# Patient Record
Sex: MALE | Race: WHITE | NOT HISPANIC OR LATINO | Employment: OTHER | URBAN - METROPOLITAN AREA
[De-identification: names, ages, dates, MRNs, and addresses within clinical notes are randomized per-mention and may not be internally consistent; named-entity substitution may affect disease eponyms.]

---

## 2022-09-09 ENCOUNTER — HOSPITAL ENCOUNTER (INPATIENT)
Facility: HOSPITAL | Age: 72
LOS: 4 days | Discharge: HOME | DRG: 440 | End: 2022-09-13
Attending: INTERNAL MEDICINE | Admitting: INTERNAL MEDICINE
Payer: MEDICARE

## 2022-09-09 DIAGNOSIS — K86.89 PANCREATIC MASS: Primary | ICD-10-CM

## 2022-09-09 DIAGNOSIS — R00.0 TACHYCARDIA: ICD-10-CM

## 2022-09-09 DIAGNOSIS — R19.7 DIARRHEA, UNSPECIFIED TYPE: ICD-10-CM

## 2022-09-09 LAB
ALBUMIN SERPL-MCNC: 2.9 GM/DL (ref 3.4–4.8)
ALBUMIN/GLOB SERPL: 0.8 RATIO (ref 1.1–2)
ALP SERPL-CCNC: 99 UNIT/L (ref 40–150)
ALT SERPL-CCNC: 25 UNIT/L (ref 0–55)
AST SERPL-CCNC: 25 UNIT/L (ref 5–34)
BASOPHILS # BLD AUTO: 0.03 X10(3)/MCL (ref 0–0.2)
BASOPHILS NFR BLD AUTO: 0.2 %
BILIRUBIN DIRECT+TOT PNL SERPL-MCNC: 1.1 MG/DL
BUN SERPL-MCNC: 21.1 MG/DL (ref 8.4–25.7)
CALCIUM SERPL-MCNC: 8.8 MG/DL (ref 8.8–10)
CHLORIDE SERPL-SCNC: 103 MMOL/L (ref 98–107)
CLOSTRIDIUM DIFFICILE GDH ANTIGEN (OHS): NEGATIVE
CLOSTRIDIUM DIFFICILE TOXIN A/B (OHS): NEGATIVE
CO2 SERPL-SCNC: 21 MMOL/L (ref 23–31)
CREAT SERPL-MCNC: 0.95 MG/DL (ref 0.73–1.18)
CRP SERPL HS-MCNC: >160 MG/L
EOSINOPHIL # BLD AUTO: 0.06 X10(3)/MCL (ref 0–0.9)
EOSINOPHIL NFR BLD AUTO: 0.3 %
ERYTHROCYTE [DISTWIDTH] IN BLOOD BY AUTOMATED COUNT: 13.3 % (ref 11.5–17)
ERYTHROCYTE [SEDIMENTATION RATE] IN BLOOD: 63 MM/HR (ref 0–15)
FECAL LEUKOCYTE (OHS): POSITIVE
GFR SERPLBLD CREATININE-BSD FMLA CKD-EPI: >60 MLS/MIN/1.73/M2
GLOBULIN SER-MCNC: 3.7 GM/DL (ref 2.4–3.5)
GLUCOSE SERPL-MCNC: 108 MG/DL (ref 82–115)
HCT VFR BLD AUTO: 39.7 % (ref 42–52)
HGB BLD-MCNC: 13.2 GM/DL (ref 14–18)
IMM GRANULOCYTES # BLD AUTO: 0.13 X10(3)/MCL (ref 0–0.04)
IMM GRANULOCYTES NFR BLD AUTO: 0.7 %
LACTATE SERPL-SCNC: 0.8 MMOL/L (ref 0.5–2.2)
LEUKO NEG CONT (OHS): NEGATIVE
LEUKO POS CONT (OHS): POSITIVE
LYMPHOCYTES # BLD AUTO: 1.28 X10(3)/MCL (ref 0.6–4.6)
LYMPHOCYTES NFR BLD AUTO: 7.1 %
MAGNESIUM SERPL-MCNC: 2.4 MG/DL (ref 1.6–2.6)
MCH RBC QN AUTO: 30.6 PG (ref 27–31)
MCHC RBC AUTO-ENTMCNC: 33.2 MG/DL (ref 33–36)
MCV RBC AUTO: 91.9 FL (ref 80–94)
MONOCYTES # BLD AUTO: 1.56 X10(3)/MCL (ref 0.1–1.3)
MONOCYTES NFR BLD AUTO: 8.7 %
MRSA PCR SCRN (OHS): NOT DETECTED
NEUTROPHILS # BLD AUTO: 15 X10(3)/MCL (ref 2.1–9.2)
NEUTROPHILS NFR BLD AUTO: 83 %
NRBC BLD AUTO-RTO: 0 %
PHOSPHATE SERPL-MCNC: 2.9 MG/DL (ref 2.3–4.7)
PLATELET # BLD AUTO: 127 X10(3)/MCL (ref 130–400)
PMV BLD AUTO: 12 FL (ref 7.4–10.4)
POTASSIUM SERPL-SCNC: 3.8 MMOL/L (ref 3.5–5.1)
PROT SERPL-MCNC: 6.6 GM/DL (ref 5.8–7.6)
RBC # BLD AUTO: 4.32 X10(6)/MCL (ref 4.7–6.1)
SODIUM SERPL-SCNC: 133 MMOL/L (ref 136–145)
WBC # SPEC AUTO: 18 X10(3)/MCL (ref 4.5–11.5)

## 2022-09-09 PROCEDURE — 11000001 HC ACUTE MED/SURG PRIVATE ROOM

## 2022-09-09 PROCEDURE — 93010 EKG 12-LEAD: ICD-10-PCS | Mod: ,,, | Performed by: INTERNAL MEDICINE

## 2022-09-09 PROCEDURE — 83605 ASSAY OF LACTIC ACID: CPT | Performed by: NURSE PRACTITIONER

## 2022-09-09 PROCEDURE — 86141 C-REACTIVE PROTEIN HS: CPT | Performed by: NURSE PRACTITIONER

## 2022-09-09 PROCEDURE — 93005 ELECTROCARDIOGRAM TRACING: CPT

## 2022-09-09 PROCEDURE — 87045 FECES CULTURE AEROBIC BACT: CPT | Performed by: NURSE PRACTITIONER

## 2022-09-09 PROCEDURE — 86318 IA INFECTIOUS AGENT ANTIBODY: CPT | Performed by: PHYSICIAN ASSISTANT

## 2022-09-09 PROCEDURE — 27000207 HC ISOLATION

## 2022-09-09 PROCEDURE — 84100 ASSAY OF PHOSPHORUS: CPT | Performed by: NURSE PRACTITIONER

## 2022-09-09 PROCEDURE — 87040 BLOOD CULTURE FOR BACTERIA: CPT | Performed by: NURSE PRACTITIONER

## 2022-09-09 PROCEDURE — 25000003 PHARM REV CODE 250: Performed by: NURSE PRACTITIONER

## 2022-09-09 PROCEDURE — 80053 COMPREHEN METABOLIC PANEL: CPT | Performed by: NURSE PRACTITIONER

## 2022-09-09 PROCEDURE — 36415 COLL VENOUS BLD VENIPUNCTURE: CPT | Performed by: NURSE PRACTITIONER

## 2022-09-09 PROCEDURE — 85651 RBC SED RATE NONAUTOMATED: CPT | Performed by: NURSE PRACTITIONER

## 2022-09-09 PROCEDURE — 85025 COMPLETE CBC W/AUTO DIFF WBC: CPT | Performed by: NURSE PRACTITIONER

## 2022-09-09 PROCEDURE — 83735 ASSAY OF MAGNESIUM: CPT | Performed by: NURSE PRACTITIONER

## 2022-09-09 PROCEDURE — 87798 DETECT AGENT NOS DNA AMP: CPT | Performed by: INTERNAL MEDICINE

## 2022-09-09 PROCEDURE — 93010 ELECTROCARDIOGRAM REPORT: CPT | Mod: ,,, | Performed by: INTERNAL MEDICINE

## 2022-09-09 PROCEDURE — 25000003 PHARM REV CODE 250: Performed by: INTERNAL MEDICINE

## 2022-09-09 PROCEDURE — 86403 PARTICLE AGGLUT ANTBDY SCRN: CPT | Performed by: NURSE PRACTITIONER

## 2022-09-09 PROCEDURE — 87641 MR-STAPH DNA AMP PROBE: CPT | Performed by: NURSE PRACTITIONER

## 2022-09-09 PROCEDURE — 63600175 PHARM REV CODE 636 W HCPCS: Performed by: NURSE PRACTITIONER

## 2022-09-09 RX ORDER — AMLODIPINE BESYLATE 10 MG/1
10 TABLET ORAL DAILY
COMMUNITY

## 2022-09-09 RX ORDER — HYDROCODONE BITARTRATE AND ACETAMINOPHEN 5; 325 MG/1; MG/1
1 TABLET ORAL EVERY 6 HOURS PRN
Status: DISCONTINUED | OUTPATIENT
Start: 2022-09-09 | End: 2022-09-13 | Stop reason: HOSPADM

## 2022-09-09 RX ORDER — IPRATROPIUM BROMIDE AND ALBUTEROL SULFATE 2.5; .5 MG/3ML; MG/3ML
3 SOLUTION RESPIRATORY (INHALATION) EVERY 4 HOURS PRN
Status: DISCONTINUED | OUTPATIENT
Start: 2022-09-09 | End: 2022-09-13 | Stop reason: HOSPADM

## 2022-09-09 RX ORDER — ACETAMINOPHEN 500 MG
1000 TABLET ORAL EVERY 6 HOURS PRN
Status: DISCONTINUED | OUTPATIENT
Start: 2022-09-09 | End: 2022-09-13 | Stop reason: HOSPADM

## 2022-09-09 RX ORDER — AMLODIPINE BESYLATE 5 MG/1
10 TABLET ORAL DAILY
Status: DISCONTINUED | OUTPATIENT
Start: 2022-09-09 | End: 2022-09-13 | Stop reason: HOSPADM

## 2022-09-09 RX ORDER — BUPROPION HYDROCHLORIDE 150 MG/1
150 TABLET ORAL DAILY
COMMUNITY

## 2022-09-09 RX ORDER — MELOXICAM 7.5 MG/1
7.5 TABLET ORAL DAILY
COMMUNITY

## 2022-09-09 RX ORDER — IBUPROFEN 200 MG
24 TABLET ORAL
Status: DISCONTINUED | OUTPATIENT
Start: 2022-09-09 | End: 2022-09-13 | Stop reason: HOSPADM

## 2022-09-09 RX ORDER — ONDANSETRON 2 MG/ML
4 INJECTION INTRAMUSCULAR; INTRAVENOUS EVERY 4 HOURS PRN
Status: DISCONTINUED | OUTPATIENT
Start: 2022-09-09 | End: 2022-09-13 | Stop reason: HOSPADM

## 2022-09-09 RX ORDER — COLCHICINE 0.6 MG/1
0.6 TABLET ORAL DAILY
COMMUNITY

## 2022-09-09 RX ORDER — HYDROCHLOROTHIAZIDE 12.5 MG/1
12.5 TABLET ORAL DAILY
COMMUNITY

## 2022-09-09 RX ORDER — GLUCAGON 1 MG
1 KIT INJECTION
Status: DISCONTINUED | OUTPATIENT
Start: 2022-09-09 | End: 2022-09-13 | Stop reason: HOSPADM

## 2022-09-09 RX ORDER — MORPHINE SULFATE 4 MG/ML
4 INJECTION, SOLUTION INTRAMUSCULAR; INTRAVENOUS EVERY 4 HOURS PRN
Status: DISCONTINUED | OUTPATIENT
Start: 2022-09-09 | End: 2022-09-13 | Stop reason: HOSPADM

## 2022-09-09 RX ORDER — LOSARTAN POTASSIUM 100 MG/1
100 TABLET ORAL DAILY
COMMUNITY

## 2022-09-09 RX ORDER — ATORVASTATIN CALCIUM 10 MG/1
20 TABLET, FILM COATED ORAL NIGHTLY
Status: DISCONTINUED | OUTPATIENT
Start: 2022-09-09 | End: 2022-09-13 | Stop reason: HOSPADM

## 2022-09-09 RX ORDER — SIMVASTATIN 20 MG/1
20 TABLET, FILM COATED ORAL NIGHTLY
COMMUNITY

## 2022-09-09 RX ORDER — SODIUM CHLORIDE, SODIUM LACTATE, POTASSIUM CHLORIDE, CALCIUM CHLORIDE 600; 310; 30; 20 MG/100ML; MG/100ML; MG/100ML; MG/100ML
INJECTION, SOLUTION INTRAVENOUS CONTINUOUS
Status: DISCONTINUED | OUTPATIENT
Start: 2022-09-09 | End: 2022-09-13 | Stop reason: HOSPADM

## 2022-09-09 RX ORDER — IBUPROFEN 200 MG
16 TABLET ORAL
Status: DISCONTINUED | OUTPATIENT
Start: 2022-09-09 | End: 2022-09-13 | Stop reason: HOSPADM

## 2022-09-09 RX ADMIN — DOXYCYCLINE 100 MG: 100 INJECTION, POWDER, LYOPHILIZED, FOR SOLUTION INTRAVENOUS at 04:09

## 2022-09-09 RX ADMIN — SODIUM CHLORIDE, POTASSIUM CHLORIDE, SODIUM LACTATE AND CALCIUM CHLORIDE: 600; 310; 30; 20 INJECTION, SOLUTION INTRAVENOUS at 12:09

## 2022-09-09 RX ADMIN — AMLODIPINE BESYLATE 10 MG: 5 TABLET ORAL at 02:09

## 2022-09-09 RX ADMIN — PIPERACILLIN AND TAZOBACTAM 4.5 G: 4; .5 INJECTION, POWDER, LYOPHILIZED, FOR SOLUTION INTRAVENOUS; PARENTERAL at 04:09

## 2022-09-09 RX ADMIN — ATORVASTATIN CALCIUM 20 MG: 10 TABLET, FILM COATED ORAL at 08:09

## 2022-09-09 RX ADMIN — SODIUM CHLORIDE, POTASSIUM CHLORIDE, SODIUM LACTATE AND CALCIUM CHLORIDE: 600; 310; 30; 20 INJECTION, SOLUTION INTRAVENOUS at 04:09

## 2022-09-09 RX ADMIN — PIPERACILLIN AND TAZOBACTAM 4.5 G: 4; .5 INJECTION, POWDER, LYOPHILIZED, FOR SOLUTION INTRAVENOUS; PARENTERAL at 12:09

## 2022-09-09 RX ADMIN — PIPERACILLIN AND TAZOBACTAM 4.5 G: 4; .5 INJECTION, POWDER, LYOPHILIZED, FOR SOLUTION INTRAVENOUS; PARENTERAL at 08:09

## 2022-09-09 NOTE — CONSULTS
Gastroenterology Consult    Reason for Consult:     Pancreatic mass    HPI:  Yosvany Driver is a 72 y.o. male unknown to our service w/ pmhx of HTN, HLD, alcoholism, gout, and anxiety/depression transferred from [facility]. He presented to the ED there yesterday with abd pain, n/v/d. He reports having epigastric pain for about 3 weeks, presented to [place] ED at that time and was diagnosed with acute pancreatitis. He spent three days inpatient, reports that he was given IV abx then. symptoms improved and he was discharged. The pain gradually returned with new onset watery diarrhea x 1 week. He now reports epigastric pain radiating into the LUQ. Upon admission here, he has a WBC of 23.4, lipase 533, findings of colitis, pancreatitis, a pancreatic mass and multiple small liver cysts. He is on doxycycline and zosyn here. LFTs wnl. VSS     He states that he quit drinking 8-9 weeks ago, not because he was experiencing pain but just because he thought it was time to finally quit. He has been drinking for many years, and admits that his consumption was much more significant when he was younger. Recently he was drinking about 2 beers Monday and Wednesday, and then 8-10 beers every Friday/Saturday. He smokes but has been cutting back slowly.    CT abd/pelv w/o contrast 9/8/22: focal colitis of splenic flexure of colon with moderate periserosal inflammatory stranding. Infectious vs. Ischemic colitis considered. Meghan left lung infiltrates, renal calculi without obstruction.    Ct abd/pelv w/ contrast 9/8/22: hypoenhancing small mass occupying the inferior head of the pancreas highly suspicious for neoplasm. Associated w/ pancreatic ductal dilatation.     At home meds: amlodipine, HCTZ, mobic, creon, losartan, simvastatin, wellbutrin.     No hx of abd surgeries reported. No significant family hx. He does report hx of EGD and colonoscopy but states they were many years ago, results unknown.     PCP:  Provider  [facility]    Review of patient's allergies indicates:  No Known Allergies    Current Facility-Administered Medications   Medication Dose Route Frequency Provider Last Rate Last Admin    acetaminophen tablet 1,000 mg  1,000 mg Oral Q6H PRN Pily ALFREDO Hayley, FNP        albuterol-ipratropium 2.5 mg-0.5 mg/3 mL nebulizer solution 3 mL  3 mL Nebulization Q4H PRN Pily ALFREDO Hayley, FNP        doxycycline (VIBRAMYCIN) 100 mg in dextrose 5 % 250 mL IVPB  100 mg Intravenous Q12H Pily Tranch, FNP        glucagon (human recombinant) injection 1 mg  1 mg Intramuscular PRN Esperanza B Doumit, AGACNP-BC        glucose chewable tablet 16 g  16 g Oral PRN Esperanza B Doumit, AGACNP-BC        glucose chewable tablet 24 g  24 g Oral PRN Esperanza B Doumit, AGACNP-BC        HYDROcodone-acetaminophen 5-325 mg per tablet 1 tablet  1 tablet Oral Q6H PRN Pily ALFREDO Hayley, FNP        lactated ringers infusion   Intravenous Continuous Pily Hardy,  mL/hr at 09/09/22 0429 New Bag at 09/09/22 0429    morphine injection 4 mg  4 mg Intravenous Q4H PRN Pily ALFREDO Hayley, FNP        ondansetron injection 4 mg  4 mg Intravenous Q4H PRN Pily ALFREDO Hayley, FNP        piperacillin-tazobactam (ZOSYN) 4.5 g in dextrose 5 % in water (D5W) 5 % 100 mL IVPB (MB+)  4.5 g Intravenous Q8H Pily Hardy, FNP   Stopped at 09/09/22 0829     Medications Prior to Admission   Medication Sig Dispense Refill Last Dose    amLODIPine (NORVASC) 10 MG tablet Take 10 mg by mouth once daily.       buPROPion (WELLBUTRIN XL) 150 MG TB24 tablet Take 150 mg by mouth once daily.       colchicine (COLCRYS) 0.6 mg tablet Take 0.6 mg by mouth once daily.       hydroCHLOROthiazide (HYDRODIURIL) 12.5 MG Tab Take 12.5 mg by mouth once daily.       lipase/protease/amylase (PANCRELIPASE EC ORAL)        losartan (COZAAR) 100 MG tablet Take 100 mg by mouth once daily.       meloxicam (MOBIC) 7.5 MG tablet Take 7.5 mg by mouth once daily.       simvastatin (ZOCOR) 20 MG tablet Take 20 mg by mouth every  evening.          Past Medical History:  No past medical history on file.    Past Surgical History:  No past surgical history on file.    Family History:  No family history on file.    Social History:  Social History     Tobacco Use    Smoking status: Not on file    Smokeless tobacco: Not on file   Substance Use Topics    Alcohol use: Not on file           Review of Systems:    Review of Systems   Constitutional:  Negative for fever and unexpected weight change.   Respiratory:  Negative for cough and shortness of breath.    Cardiovascular:  Negative for chest pain and leg swelling.   Gastrointestinal:  Positive for abdominal pain, diarrhea, nausea and vomiting. Negative for blood in stool.   Musculoskeletal:  Negative for back pain and myalgias.   Skin:  Negative for color change and pallor.   Neurological:  Negative for speech difficulty and weakness.   All other systems reviewed and are negative.    Objective:      VITAL SIGNS: 24 HR MIN & MAX LAST  Temp  Min: 97.8 °F (36.6 °C)  Max: 99.3 °F (37.4 °C) 99.3 °F (37.4 °C)  BP  Min: 125/73  Max: 142/79 125/73  Pulse  Min: 83  Max: 95 83  Resp  Min: 18  Max: 20 20  SpO2  Min: 93 %  Max: 96 % (!) 94 %    No intake or output data in the 24 hours ending 09/09/22 1131    Physical Exam  Vitals and nursing note reviewed.   Constitutional:       Appearance: Normal appearance.   HENT:      Head: Normocephalic and atraumatic.   Eyes:      General: No scleral icterus.     Extraocular Movements: Extraocular movements intact.   Cardiovascular:      Rate and Rhythm: Normal rate and regular rhythm.      Heart sounds: Normal heart sounds.   Pulmonary:      Effort: Pulmonary effort is normal. No respiratory distress.      Breath sounds: Normal breath sounds.   Abdominal:      General: Abdomen is flat. There is no distension.      Palpations: Abdomen is soft.      Tenderness: There is abdominal tenderness.      Comments: BS quiet   Musculoskeletal:         General: No swelling or  deformity. Normal range of motion.      Right lower leg: No edema.      Left lower leg: No edema.   Skin:     General: Skin is warm and dry.   Neurological:      General: No focal deficit present.      Mental Status: He is alert and oriented to person, place, and time.      Coordination: Abnormal coordination: ..   Psychiatric:         Mood and Affect: Mood normal.         Behavior: Behavior normal.       Recent Results (from the past 48 hour(s))   Comprehensive Metabolic Panel    Collection Time: 09/09/22  5:00 AM   Result Value Ref Range    Sodium Level 133 (L) 136 - 145 mmol/L    Potassium Level 3.8 3.5 - 5.1 mmol/L    Chloride 103 98 - 107 mmol/L    Carbon Dioxide 21 (L) 23 - 31 mmol/L    Glucose Level 108 82 - 115 mg/dL    Blood Urea Nitrogen 21.1 8.4 - 25.7 mg/dL    Creatinine 0.95 0.73 - 1.18 mg/dL    Calcium Level Total 8.8 8.8 - 10.0 mg/dL    Protein Total 6.6 5.8 - 7.6 gm/dL    Albumin Level 2.9 (L) 3.4 - 4.8 gm/dL    Globulin 3.7 (H) 2.4 - 3.5 gm/dL    Albumin/Globulin Ratio 0.8 (L) 1.1 - 2.0 ratio    Bilirubin Total 1.1 <=1.5 mg/dL    Alkaline Phosphatase 99 40 - 150 unit/L    Alanine Aminotransferase 25 0 - 55 unit/L    Aspartate Aminotransferase 25 5 - 34 unit/L    eGFR >60 mls/min/1.73/m2   Magnesium    Collection Time: 09/09/22  5:00 AM   Result Value Ref Range    Magnesium Level 2.40 1.60 - 2.60 mg/dL   Phosphorus    Collection Time: 09/09/22  5:00 AM   Result Value Ref Range    Phosphorus Level 2.9 2.3 - 4.7 mg/dL   Sedimentation rate    Collection Time: 09/09/22  5:00 AM   Result Value Ref Range    Sed Rate 63 (H) 0 - 15 mm/hr   CRP, High Sensitivity    Collection Time: 09/09/22  5:00 AM   Result Value Ref Range    C-Reactive Protein High Sensitivity >160.00 (H) <=5.00 mg/L   Lactic Acid, Plasma    Collection Time: 09/09/22  5:00 AM   Result Value Ref Range    Lactic Acid Level 0.8 0.5 - 2.2 mmol/L   CBC with Differential    Collection Time: 09/09/22  5:00 AM   Result Value Ref Range    WBC 18.0  "(H) 4.5 - 11.5 x10(3)/mcL    RBC 4.32 (L) 4.70 - 6.10 x10(6)/mcL    Hgb 13.2 (L) 14.0 - 18.0 gm/dL    Hct 39.7 (L) 42.0 - 52.0 %    MCV 91.9 80.0 - 94.0 fL    MCH 30.6 27.0 - 31.0 pg    MCHC 33.2 33.0 - 36.0 mg/dL    RDW 13.3 11.5 - 17.0 %    Platelet 127 (L) 130 - 400 x10(3)/mcL    MPV 12.0 (H) 7.4 - 10.4 fL    Neut % 83.0 %    Lymph % 7.1 %    Mono % 8.7 %    Eos % 0.3 %    Basophil % 0.2 %    Lymph # 1.28 0.6 - 4.6 x10(3)/mcL    Neut # 15.0 (H) 2.1 - 9.2 x10(3)/mcL    Mono # 1.56 (H) 0.1 - 1.3 x10(3)/mcL    Eos # 0.06 0 - 0.9 x10(3)/mcL    Baso # 0.03 0 - 0.2 x10(3)/mcL    IG# 0.13 (H) 0 - 0.04 x10(3)/mcL    IG% 0.7 %    NRBC% 0.0 %   MRSA PCR    Collection Time: 09/09/22  7:15 AM   Result Value Ref Range    MRSA PCR SCRN ([facility]) Not Detected Not Detected       No results found.      Assessment & Plan:  71 y/o male unknown to our service transferred here from [place] with acute abd pain, n/v/d. Records obtained from paper chart and pt.     Pancreatic mass  - noted "small hypodense mass occupying the medial pancreatic head having maximal dimensions of 1.6 x 2.2 x 2.7 cm. Associated with mild abnormal dilation of the pancreatic duct. No evidence of pancreatitis." This finding ws not evident on noncontrast CT.     Acute pancreatitis  - by way of elevated lipase 533 and pain, despite negative CT findings.  - continue IVFs (preferably LR), antiemetics and pain meds PRN.   - clear liquids for now. Pt thinks he can tolerate clears and then we can slowly ADAT to low fat fulls.     Colitis w/ diarrhea  - s/p abx inpatient 3 weeks ago. Will r/o c.diff  - continue zosyn    - pt will need EUS. Will contact Dr. Bar for inpatient vs. Outpt EUS plan.     Thank you for allowing us to participate in this patient's care.    Ashleigh Partida PA-C  [facility]    "

## 2022-09-09 NOTE — PROGRESS NOTES
Inpatient Nutrition Assessment    Admit Date: 9/9/2022   Total duration of encounter: 1 day     Nutrition Recommendation/Prescription     - Advance diet as tolerated per MD. Goal Diet: Cardiac Diet.   - Boost Plus TID for additional nourishment with diet advancement; provides 360 kcal and 14 gm protein per container.   - Monitor wt, labs, and intake.     Communication of Recommendations: reviewed with patient/caregiver    Nutrition Assessment     Malnutrition Assessment/Nutrition-Focused Physical Exam    Malnutrition in the context of acute illness or injury  Degree of Malnutrition: non-severe (moderate) malnutrition  Energy Intake: < 75% of estimated energy requirement for > 7 days  Interpretation of Weight Loss: >2% in 1 week  Body Fat:moderate depletion  Area of Body Fat Loss: orbital region   Muscle Mass Loss: mild depletion  Area of Muscle Mass Loss: temple region - temporalis muscle  Fluid Accumulation: unable to obtain  Edema: unable to obtain   Reduced  Strength: unable to obtain  A minimum of two characteristics is recommended for diagnosis of either severe or non-severe malnutrition.    Chart Review    Reason Seen: malnutrition screening tool    Diagnosis:  Pancreatic Mass with Pancreatic Duct Dilation  Acute Epigastric Abdominal Pain  Colitis  Possible LLL Pneumonia  Leukocytosis  Elevated Inflammatory Markers  Essential HTN    Relevant Medical History:  HTN, HLD, gout, anxiety, depression     Nutrition-Related Medications: LR, zofran PRN  Calorie Containing IV Medications: no significant kcals from medications at this time    Nutrition-Related Labs:  9/9: Na 133, Glu 108, GFR>60    Diet/PN Order: Diet clear liquid  Oral Supplement Order: none at this time  Tube Feeding Order: none at this time  Appetite/Oral Intake: poor/0-25% of meals  Factors Affecting Nutritional Intake: clear liquid diet and decreased appetite  Food/Islam/Cultural Preferences: none reported       Wound(s):   none  "noted    Comments    22: Pt reports decreased appetite; denies n/v; reports decreased appetite for the past 2-3 weeks with wt loss.     Anthropometrics    Height: 5' 2.01" (157.5 cm)    Last Weight: 83.9 kg (184 lb 15.5 oz) (22 1305) Weight Method: Stated  BMI (Calculated): 33.8  BMI Classification: obese grade I (BMI 30-34.9)        Ideal Body Weight (IBW), Male: 118.06 lb  % Ideal Body Weight, Male (lb): 156.67 %           Usual Body Weight (UBW), k.5 kg  % Usual Body Weight: 93.94  Usual Weight Provided By: patient    Wt Readings from Last 5 Encounters:   22 83.9 kg (184 lb 15.5 oz)     Weight Change(s) Since Admission:  Admit Weight: 83.9 kg (185 lb) (22 0214)    Estimated Needs    Weight Used For Calorie Calculations: 83.9 kg (184 lb 15.5 oz)  Energy Calorie Requirements (kcal): 1921 kcal (MSJ x 1.3 SF)  Energy Need Method: Hubbard-St Jeor  Weight Used For Protein Calculations: 83.9 kg (184 lb 15.5 oz)  Protein Requirements: 126 gm (1.5 g/kg)  Fluid Requirements (mL): 1921  Temp: 98.6 °F (37 °C)       Enteral Nutrition    Patient not receiving enteral nutrition at this time.    Parenteral Nutrition    Patient not receiving parenteral nutrition support at this time.    Evaluation of Received Nutrient Intake    Calories: not meeting estimated needs  Protein: not meeting estimated needs    Patient Education    Not applicable.    Nutrition Diagnosis     PES: Malnutrition related to insufficient energy intake as evidenced by <75% est energy requirements >7 days, >2% wt loss x 1 week, physical evidence of mild muscle and fat wasting. (new)    Interventions/Goals     Intervention(s): modified composition of meals/snacks and collaboration with other providers  Goal: Meet greater than 75% of nutritional needs by follow-up. (new)    Monitoring & Evaluation     Dietitian will monitor food and beverage intake and weight change.  Nutrition Risk/Follow-Up: high (follow-up in 1-4 days)    "

## 2022-09-09 NOTE — PLAN OF CARE
09/09/22 0932   Discharge Assessment   Assessment Type Discharge Planning Assessment   Confirmed/corrected address, phone number and insurance Yes   Confirmed Demographics Correct on Facesheet   Source of Information patient   Reason For Admission pancreatic mass   Lives With alone   Do you expect to return to your current living situation? Yes   Do you have help at home or someone to help you manage your care at home? No   Prior to hospitilization cognitive status: Alert/Oriented;No Deficits   Current cognitive status: Alert/Oriented;No Deficits   Walking or Climbing Stairs Difficulty none   Dressing/Bathing Difficulty none   Equipment Currently Used at Home none   Do you currently have service(s) that help you manage your care at home? No   Who is going to help you get home at discharge? Son Jr Yosvany [phone] or granddaughter Kati [phone]   How do you get to doctors appointments? car, drives self   Are you on dialysis? No   Do you take coumadin? No   Discharge Plan A Home   Discharge Plan B Home   Discharge Plan discussed with: Patient   Discharge Barriers Identified None   Pt states he lives at home alone. He states he is independent with ADL's. He is still working. He is able to drive. No DME. No HH. Pt states son or granddaughter will drive him home upon discharge. PCP is Hanna Carbajal NP in [place].

## 2022-09-09 NOTE — H&P
[facility] Hospital Medicine History & Physical Examination       Patient Name: Yosvany Driver  MRN: [id number]  Patient Class: IP- Inpatient   Admission Date: 09/09/2022   Admitting Service: Hospital Medicine   Length of Stay: 0  Attending Physician: Dr. Prater  Primary Care Provider: Hyacinth Goldman  Face-to-Face encounter date: 09/09/2022  Code Status: Full  Chief Complaint: No chief complaint on file.    Source of Information: Patient. Medical Records      HISTORY OF PRESENT ILLNESS:   Yosvany Driver is a 72 y.o. male with a PMHx of HTN, HLD, gout, anxiety, depression who presented to [facility] on 9/9/2022 as a transfer from [facility] for GI Services. He initially presented to the [facility] ED with c/o epigastric abdominal pain and diarrhea. Work-up done revealing WBC 23.46, lipase 533. CT abd/pelvis without contrast done at the [facility] ED revealed colitis; patchy infiltrate left lung base, renal calculi without obstruction. CT abd/pelvis with contrast revealed pancreatic mass suspicious for pancreatic neoplasm with pancreatic duct dilation. He was transferred to [facility] for GI services. Admitted to hospital medicine services. GI consulted. Started on IV abx. Blood cultures are pending. Repeat labs notable for WBC 18, hgb 13.2, hct 39.7, platelets 127, sed rate 63, sodium 133, CO2 21, albumin 2.9, CRP >169.     PAST MEDICAL HISTORY:   HTN, HLD, gout, anxiety, depression    PAST SURGICAL HISTORY:   No past surgical history on file.    ALLERGIES:   Patient has no known allergies.    FAMILY HISTORY:   Reviewed and negative    SOCIAL HISTORY:   Denied alcohol, tobacco or illicit drug use    HOME MEDICATIONS:     Prior to Admission medications    Medication Sig Start Date End Date Taking? Authorizing Provider   amLODIPine (NORVASC) 10 MG tablet Take 10 mg by mouth once daily.    Historical Provider   buPROPion (WELLBUTRIN XL) 150 MG TB24 tablet Take 150 mg by mouth once  daily.    Historical Provider   colchicine (COLCRYS) 0.6 mg tablet Take 0.6 mg by mouth once daily.    Historical Provider   hydroCHLOROthiazide (HYDRODIURIL) 12.5 MG Tab Take 12.5 mg by mouth once daily.    Historical Provider   lipase/protease/amylase (PANCRELIPASE EC ORAL)     Historical Provider   losartan (COZAAR) 100 MG tablet Take 100 mg by mouth once daily.    Historical Provider   meloxicam (MOBIC) 7.5 MG tablet Take 7.5 mg by mouth once daily.    Historical Provider   simvastatin (ZOCOR) 20 MG tablet Take 20 mg by mouth every evening.    Historical Provider       __________________________________________________________________________  INPATIENT LIST OF MEDICATIONS     Current Facility-Administered Medications:     acetaminophen tablet 1,000 mg, 1,000 mg, Oral, Q6H PRN, MUKSEH Cagle    albuterol-ipratropium 2.5 mg-0.5 mg/3 mL nebulizer solution 3 mL, 3 mL, Nebulization, Q4H PRN, MUKESH Cagle    doxycycline (VIBRAMYCIN) 100 mg in dextrose 5 % 250 mL IVPB, 100 mg, Intravenous, Q12H, MUKESH Cagle    HYDROcodone-acetaminophen 5-325 mg per tablet 1 tablet, 1 tablet, Oral, Q6H PRN, MUKESH Cagle    lactated ringers infusion, , Intravenous, Continuous, MUKESH Cagle, Last Rate: 100 mL/hr at 09/09/22 0429, New Bag at 09/09/22 0429    morphine injection 4 mg, 4 mg, Intravenous, Q4H PRN, MUKESH Cagle    ondansetron injection 4 mg, 4 mg, Intravenous, Q4H PRN, MUKESH Cagle    piperacillin-tazobactam (ZOSYN) 4.5 g in dextrose 5 % in water (D5W) 5 % 100 mL IVPB (MB+), 4.5 g, Intravenous, Q8H, MUKESH Cagle, Stopped at 09/09/22 0829      Scheduled Meds:   doxycycline (VIBRAMYCIN) IVPB  100 mg Intravenous Q12H    piperacillin-tazobactam (ZOSYN) IVPB  4.5 g Intravenous Q8H     Continuous Infusions:   lactated ringers 100 mL/hr at 09/09/22 0429     PRN Meds:.acetaminophen, albuterol-ipratropium, HYDROcodone-acetaminophen, morphine, ondansetron      REVIEW OF SYSTEMS:   Except  as documented, all other systems reviewed and negative     PHYSICAL EXAM:     VITAL SIGNS: 24 HRS MIN & MAX LAST   Temp  Min: 97.8 °F (36.6 °C)  Max: 98.2 °F (36.8 °C) 98.2 °F (36.8 °C)   BP  Min: 135/76  Max: 142/79 135/76   Pulse  Min: 89  Max: 95  89   Resp  Min: 18  Max: 19 19   SpO2  Min: 93 %  Max: 96 % (!) 93 %         General appearance: Well-developed male in no apparent distress.  HENT: Atraumatic head. Moist mucous membranes of oral cavity.  Eyes: Normal extraocular movements.   Neck: Supple.   Lungs: Clear to auscultation bilaterally.   Heart: Regular rate and rhythm. S1 and S2 present. No pedal edema.  Abdomen: Soft, non-distended. Epigastric +TTP  Extremities: No cyanosis, clubbing, or edema.  Skin: No Rash.   Neuro: Motor and sensory exams grossly intact.   Psych/mental status: Appropriate mood and affect. Responds appropriately to questions.     LABS AND IMAGING:     Recent Labs   Lab 09/09/22 0500   WBC 18.0*   RBC 4.32*   HGB 13.2*   HCT 39.7*   MCV 91.9   MCH 30.6   MCHC 33.2   RDW 13.3   *   MPV 12.0*       Recent Labs   Lab 09/09/22 0500   *   K 3.8   CO2 21*   BUN 21.1   CREATININE 0.95   CALCIUM 8.8   MG 2.40   ALBUMIN 2.9*   ALKPHOS 99   ALT 25   AST 25   BILITOT 1.1       Microbiology Results (last 7 days)       Procedure Component Value Units Date/Time    Blood Culture [[id number]] Collected: 09/09/22 0500    Order Status: Resulted Specimen: Blood Updated: 09/09/22 0509    Blood Culture [[id number]] Collected: 09/09/22 0500    Order Status: Resulted Specimen: Blood Updated: 09/09/22 0509    Stool Culture [[id number]]     Order Status: Sent Specimen: Stool              No image results found.        ASSESSMENT & PLAN:   Pancreatic Mass with Pancreatic Duct Dilation  Acute Epigastric Abdominal Pain  Colitis  Possible LLL Pneumonia  Leukocytosis  Elevated Inflammatory Markers  Essential HTN  Hx of HLD, anxiety, depression    Plan:  NPO  GI consulted, appreciate assistance and  recommendations  IV abx- Zosyn and Doxycycline  Follow blood cultures  LR at 100 ml/hr  Stool culture, fecal leukocytes  PRN analgesics and antiemetics  Resume appropriate home medications once updated  Labs in AM        VTE Prophylaxis: SCDs      Discharge Planning and Disposition: TBD    I, Esperanza Rich, NP have reviewed and discussed the case with Dr. Seaman.  Please see the following addendum for further assessment and plan from the attending MD.    Esperanza Rich, AGACNP-BC  09/09/2022    ________________________________________________________________________________    MD Addendum:  I, Dr. Shelli seaman  assumed care of this patient today at  11.30 am  For the patient encounter, I performed the substantive portion of the visit, I reviewed the NP/PA documentation, treatment plan, and medical decision making.  I had face to face time with this patient     72-year-old male with past medical history of hypertension hyperlipidemia alcoholism citing depression was transferred from [facility] because of pancreatic mass.  Patient presented there with complaints of epigastric pain that is been going on for the past 3 weeks he was admitted there was given IV antibiotics and was discharged.  His pain came back again and now it was associated with diarrhea pain was epigastric in nature radiating to left upper quadrant.  Patient had CT of the abdomen and pelvis done that showed focal colitis of splenic flexure of the colon with moderate very serosal inflammatory stranding with patchy left lung infiltrate and also CT of the abdomen with contrast showed hyper enhancing small mass in the inferior head of the pancreas associated with pancreatic ductal dilation.  Patient was transferred here for GI services.  He has been admitted to hospitalist service and GI services has been consulted   General awake alert oriented   Chest clear to auscultate   Abdomen:  Mild tenderness to palpate in the epigastric region and  in the left upper quadrant     Patient has been started on Zosyn and doxycycline for possible colitis and left-sided infiltrate, I will get chest x-ray to have a better idea  We have also ordered C diff, stool cultures  MRSA PCR has been negative   Blood cultures x2 have been ordered   For now continue with LR at 100 cc an hour  GI consulted plan for EUS and they will discuss with Dr. Bar  about the timing    All diagnosis and differential diagnosis have been reviewed; assessment and plan has been documented; I have personally reviewed the labs and test results that are presently available; I have reviewed the patients medication list; I have reviewed the consulting providers response and recommendations. I have reviewed or attempted to review medical records based upon their availability.    All of the patient and family questions have been addressed and answered. Patient's is agreeable to the above stated plan. I will continue to monitor closely and make adjustments to medical management as needed.      09/09/2022

## 2022-09-10 LAB
ALBUMIN SERPL-MCNC: 2.6 GM/DL (ref 3.4–4.8)
ALBUMIN/GLOB SERPL: 1 RATIO (ref 1.1–2)
ALP SERPL-CCNC: 79 UNIT/L (ref 40–150)
ALT SERPL-CCNC: 18 UNIT/L (ref 0–55)
AST SERPL-CCNC: 20 UNIT/L (ref 5–34)
BASOPHILS # BLD AUTO: 0.03 X10(3)/MCL (ref 0–0.2)
BASOPHILS NFR BLD AUTO: 0.2 %
BILIRUBIN DIRECT+TOT PNL SERPL-MCNC: 0.9 MG/DL
BUN SERPL-MCNC: 11 MG/DL (ref 8.4–25.7)
CALCIUM SERPL-MCNC: 8 MG/DL (ref 8.8–10)
CHLORIDE SERPL-SCNC: 105 MMOL/L (ref 98–107)
CO2 SERPL-SCNC: 24 MMOL/L (ref 23–31)
CREAT SERPL-MCNC: 0.75 MG/DL (ref 0.73–1.18)
EOSINOPHIL # BLD AUTO: 0.08 X10(3)/MCL (ref 0–0.9)
EOSINOPHIL NFR BLD AUTO: 0.6 %
ERYTHROCYTE [DISTWIDTH] IN BLOOD BY AUTOMATED COUNT: 13.2 % (ref 11.5–17)
GFR SERPLBLD CREATININE-BSD FMLA CKD-EPI: >60 MLS/MIN/1.73/M2
GLOBULIN SER-MCNC: 2.6 GM/DL (ref 2.4–3.5)
GLUCOSE SERPL-MCNC: 118 MG/DL (ref 82–115)
HCT VFR BLD AUTO: 34.9 % (ref 42–52)
HGB BLD-MCNC: 11.8 GM/DL (ref 14–18)
IMM GRANULOCYTES # BLD AUTO: 0.09 X10(3)/MCL (ref 0–0.04)
IMM GRANULOCYTES NFR BLD AUTO: 0.6 %
LYMPHOCYTES # BLD AUTO: 1.39 X10(3)/MCL (ref 0.6–4.6)
LYMPHOCYTES NFR BLD AUTO: 9.6 %
MCH RBC QN AUTO: 31.2 PG (ref 27–31)
MCHC RBC AUTO-ENTMCNC: 33.8 MG/DL (ref 33–36)
MCV RBC AUTO: 92.3 FL (ref 80–94)
MONOCYTES # BLD AUTO: 1.33 X10(3)/MCL (ref 0.1–1.3)
MONOCYTES NFR BLD AUTO: 9.2 %
NEUTROPHILS # BLD AUTO: 11.6 X10(3)/MCL (ref 2.1–9.2)
NEUTROPHILS NFR BLD AUTO: 79.8 %
NRBC BLD AUTO-RTO: 0 %
PLATELET # BLD AUTO: 138 X10(3)/MCL (ref 130–400)
PMV BLD AUTO: 12.5 FL (ref 7.4–10.4)
POTASSIUM SERPL-SCNC: 3.8 MMOL/L (ref 3.5–5.1)
PROT SERPL-MCNC: 5.2 GM/DL (ref 5.8–7.6)
RBC # BLD AUTO: 3.78 X10(6)/MCL (ref 4.7–6.1)
SODIUM SERPL-SCNC: 134 MMOL/L (ref 136–145)
WBC # SPEC AUTO: 14.5 X10(3)/MCL (ref 4.5–11.5)

## 2022-09-10 PROCEDURE — 27000207 HC ISOLATION

## 2022-09-10 PROCEDURE — 25000003 PHARM REV CODE 250: Performed by: NURSE PRACTITIONER

## 2022-09-10 PROCEDURE — 80053 COMPREHEN METABOLIC PANEL: CPT | Performed by: NURSE PRACTITIONER

## 2022-09-10 PROCEDURE — 63600175 PHARM REV CODE 636 W HCPCS: Performed by: INTERNAL MEDICINE

## 2022-09-10 PROCEDURE — 25000003 PHARM REV CODE 250: Performed by: INTERNAL MEDICINE

## 2022-09-10 PROCEDURE — 11000001 HC ACUTE MED/SURG PRIVATE ROOM

## 2022-09-10 PROCEDURE — 36415 COLL VENOUS BLD VENIPUNCTURE: CPT | Performed by: NURSE PRACTITIONER

## 2022-09-10 PROCEDURE — 63600175 PHARM REV CODE 636 W HCPCS: Performed by: NURSE PRACTITIONER

## 2022-09-10 PROCEDURE — 85025 COMPLETE CBC W/AUTO DIFF WBC: CPT | Performed by: NURSE PRACTITIONER

## 2022-09-10 RX ORDER — COLCHICINE 0.6 MG/1
1.2 TABLET, FILM COATED ORAL DAILY
Status: DISCONTINUED | OUTPATIENT
Start: 2022-09-10 | End: 2022-09-13 | Stop reason: HOSPADM

## 2022-09-10 RX ADMIN — HYDROCODONE BITARTRATE AND ACETAMINOPHEN 1 TABLET: 5; 325 TABLET ORAL at 01:09

## 2022-09-10 RX ADMIN — PIPERACILLIN AND TAZOBACTAM 4.5 G: 4; .5 INJECTION, POWDER, LYOPHILIZED, FOR SOLUTION INTRAVENOUS; PARENTERAL at 04:09

## 2022-09-10 RX ADMIN — AMLODIPINE BESYLATE 10 MG: 5 TABLET ORAL at 09:09

## 2022-09-10 RX ADMIN — PIPERACILLIN AND TAZOBACTAM 4.5 G: 4; .5 INJECTION, POWDER, LYOPHILIZED, FOR SOLUTION INTRAVENOUS; PARENTERAL at 06:09

## 2022-09-10 RX ADMIN — METHYLPREDNISOLONE SODIUM SUCCINATE 40 MG: 40 INJECTION, POWDER, FOR SOLUTION INTRAMUSCULAR; INTRAVENOUS at 05:09

## 2022-09-10 RX ADMIN — ATORVASTATIN CALCIUM 20 MG: 10 TABLET, FILM COATED ORAL at 09:09

## 2022-09-10 RX ADMIN — COLCHICINE 1.2 MG: 0.6 TABLET, FILM COATED ORAL at 05:09

## 2022-09-10 RX ADMIN — HYDROCODONE BITARTRATE AND ACETAMINOPHEN 1 TABLET: 5; 325 TABLET ORAL at 06:09

## 2022-09-10 RX ADMIN — DOXYCYCLINE 100 MG: 100 INJECTION, POWDER, LYOPHILIZED, FOR SOLUTION INTRAVENOUS at 04:09

## 2022-09-10 RX ADMIN — PIPERACILLIN AND TAZOBACTAM 4.5 G: 4; .5 INJECTION, POWDER, LYOPHILIZED, FOR SOLUTION INTRAVENOUS; PARENTERAL at 10:09

## 2022-09-10 RX ADMIN — ACETAMINOPHEN 1000 MG: 500 TABLET, FILM COATED ORAL at 04:09

## 2022-09-10 NOTE — PROGRESS NOTES
[facility]  Hospital Medicine Progress Note        Chief Complaint: Inpatient Follow-up for     HPI: 72 y.o. male with a PMHx of HTN, HLD, gout, anxiety, depression who presented to [facility] on 9/9/2022 as a transfer from [facility] for GI Services. He initially presented to the [facility] ED with c/o epigastric abdominal pain and diarrhea. Work-up done revealing WBC 23.46, lipase 533. CT abd/pelvis without contrast done at the [facility] ED revealed colitis; patchy infiltrate left lung base, renal calculi without obstruction. CT abd/pelvis with contrast revealed pancreatic mass suspicious for pancreatic neoplasm with pancreatic duct dilation. He was transferred to [facility] for GI services. Admitted to hospital medicine services. GI consulted. Started on IV abx. Blood cultures are pending. Repeat labs notable for WBC 18, hgb 13.2, hct 39.7, platelets 127, sed rate 63, sodium 133, CO2 21, albumin 2.9, CRP >169.    C diff has been negative GI consulted and following    Interval Hx:     Patient seen and examined this morning complains of 3 loose bowel movements  Objective/physical exam:  General: In no acute distress, afebrile  Chest: Clear to auscultation bilaterally  Heart: RRR, +S1, S2, no appreciable murmur  Abdomen: Soft, nontender, BS +  MSK: Warm, no lower extremity edema, no clubbing or cyanosis  Neurologic: Alert and oriented x4,   VITAL SIGNS: 24 HRS MIN & MAX LAST   Temp  Min: 98.3 °F (36.8 °C)  Max: 99.9 °F (37.7 °C) 99.4 °F (37.4 °C)   BP  Min: 131/77  Max: 143/78 137/76   Pulse  Min: 84  Max: 102  92   Resp  Min: 16  Max: 18 18   SpO2  Min: 92 %  Max: 94 % (!) 93 %         Recent Labs   Lab 09/09/22  0500 09/10/22  0557   WBC 18.0* 14.5*   RBC 4.32* 3.78*   HGB 13.2* 11.8*   HCT 39.7* 34.9*   MCV 91.9 92.3   MCH 30.6 31.2*   MCHC 33.2 33.8   RDW 13.3 13.2   * 138   MPV 12.0* 12.5*       Recent Labs   Lab 09/09/22  0500 09/10/22  0557   * 134*   K 3.8 3.8    CO2 21* 24   BUN 21.1 11.0   CREATININE 0.95 0.75   CALCIUM 8.8 8.0*   MG 2.40  --    ALBUMIN 2.9* 2.6*   ALKPHOS 99 79   ALT 25 18   AST 25 20   BILITOT 1.1 0.9          Microbiology Results (last 7 days)       Procedure Component Value Units Date/Time    Stool Culture [[id number]]  (Normal) Collected: 09/09/22 0916    Order Status: Completed Specimen: Stool Updated: 09/10/22 1130     Stool Culture Negative for Salmonella, Shigella, Campylobacter, Vibrio, Aeromonas, Pleisiomonas,Yersinia, or Shiga Toxin 1 and 2.    Blood Culture [[id number]]  (Normal) Collected: 09/09/22 0500    Order Status: Completed Specimen: Blood Updated: 09/10/22 0600     CULTURE, BLOOD ([facility]) No Growth At 24 Hours    Blood Culture [[id number]]  (Normal) Collected: 09/09/22 0500    Order Status: Completed Specimen: Blood Updated: 09/10/22 0600     CULTURE, BLOOD ([facility]) No Growth At 24 Hours    Clostridium Diff Toxin, A & B, EIA [[id number]]  (Normal) Collected: 09/09/22 1234    Order Status: Completed Specimen: Stool Updated: 09/09/22 1351     Clostridium Difficile GDH Antigen Negative     Clostridium Difficile Toxin A/B Negative             See below for Radiology    Scheduled Med:   amLODIPine  10 mg Oral Daily    atorvastatin  20 mg Oral QHS    doxycycline (VIBRAMYCIN) IVPB  100 mg Intravenous Q12H    piperacillin-tazobactam (ZOSYN) IVPB  4.5 g Intravenous Q8H        Continuous Infusions:   lactated ringers 100 mL/hr at 09/09/22 1227        PRN Meds:  acetaminophen, albuterol-ipratropium, glucagon (human recombinant), glucose, glucose, HYDROcodone-acetaminophen, morphine, ondansetron       Assessment/Plan:  Pancreatic Mass with Pancreatic Duct Dilation  Acute Epigastric Abdominal Pain  Colitis  Possible LLL Pneumonia  Leukocytosis  Elevated Inflammatory Markers  Essential HTN  Hx of HLD, anxiety, depression     Plan:  Patient is on clear liquid diet we will advance as tolerated  Had 3 bowel movements, C diff negative stool for WBC positive  will await stool PCR panel and stool cultures  Chest x-ray done yesterday showed atelectasis patient is on Zosyn and doxycycline I will discontinue doxycycline   Continue with LR at 100 cc an hour blood cultures pending  GI following possible plan for EUS outpatient will await further GI recommendations    VTE prophylaxis: scd    Patient condition:  Stable/Fair/Guarded/ Serious/ Critical    Anticipated discharge and Disposition:         All diagnosis and differential diagnosis have been reviewed; assessment and plan has been documented; I have personally reviewed the labs and test results that are presently available; I have reviewed the patients medication list; I have reviewed the consulting providers response and recommendations. I have reviewed or attempted to review medical records based upon their availability    All of the patient's questions have been  addressed and answered. Patient's is agreeable to the above stated plan. I will continue to monitor closely and make adjustments to medical management as needed.  _____________________________________________________________________    Nutrition Status:    Radiology:  X-Ray Chest PA And Lateral  Narrative: EXAMINATION:  XR CHEST PA AND LATERAL    CLINICAL HISTORY:  ? pneumonia;    COMPARISON:  None    FINDINGS:  PA and lateral views of the chest show linear atelectasis in the left lung base without focal consolidation, pneumothorax or pleural effusion.  Cardiac silhouette and pulmonary vasculature are normal.  No acute osseous findings.  Impression: No acute findings in the chest    Electronically signed by: Dawit Durant MD  Date:    09/10/2022  Time:    10:13      Shelli Prater MD   09/10/2022

## 2022-09-11 LAB
ANION GAP SERPL CALC-SCNC: 8 MEQ/L
B PARAP IS1001 DNA CT SPEC QN NAA+PROBE: NOT DETECTED
B PERT+PARAP PTXS1 CT SPEC QN NAA+PROBE: NOT DETECTED
BACTERIA STL CULT: NORMAL
BASOPHILS # BLD AUTO: 0.01 X10(3)/MCL (ref 0–0.2)
BASOPHILS NFR BLD AUTO: 0.1 %
BUN SERPL-MCNC: 10.7 MG/DL (ref 8.4–25.7)
CALCIUM SERPL-MCNC: 9.2 MG/DL (ref 8.8–10)
CHLAMYDIA SP IGG+IGM PNL TITR SER IF: NOT DETECTED
CHLORIDE SERPL-SCNC: 103 MMOL/L (ref 98–107)
CO2 SERPL-SCNC: 23 MMOL/L (ref 23–31)
CREAT SERPL-MCNC: 0.76 MG/DL (ref 0.73–1.18)
CREAT/UREA NIT SERPL: 14
EOSINOPHIL # BLD AUTO: 0 X10(3)/MCL (ref 0–0.9)
EOSINOPHIL NFR BLD AUTO: 0 %
ERYTHROCYTE [DISTWIDTH] IN BLOOD BY AUTOMATED COUNT: 12.7 % (ref 11.5–17)
FLUAV AG UPPER RESP QL IA.RAPID: NOT DETECTED
FLUAV H1 2009 RNA SPEC NAA+PROBE-IMP: NOT DETECTED
FLUAV H3 HA GENE NPH QL NAA+PROBE: NOT DETECTED
FLUBV AG UPPER RESP QL IA.RAPID: NOT DETECTED
GFR SERPLBLD CREATININE-BSD FMLA CKD-EPI: >60 MLS/MIN/1.73/M2
GLUCOSE SERPL-MCNC: 148 MG/DL (ref 82–115)
HADV DNA NPH QL NAA+NON-PROBE: NOT DETECTED
HCOV 229E+OC43 RNA NPH QL NAA+PROBE: NOT DETECTED
HCOV HKU1 RNA SPEC QL NAA+PROBE: NOT DETECTED
HCOV NL63 RNA SPEC QL NAA+PROBE: NOT DETECTED
HCOV OC43 RNA SPEC QL NAA+PROBE: NOT DETECTED
HCT VFR BLD AUTO: 37.9 % (ref 42–52)
HGB BLD-MCNC: 12.9 GM/DL (ref 14–18)
HMPV RNA SPEC QL NAA+PROBE: NOT DETECTED
HPIV1 F GENE NPH QL NAA+PROBE: NOT DETECTED
HPIV2 L GENE NPH QL NAA+PROBE: NOT DETECTED
HPIV3 NP GENE NPH QL NAA+PROBE: NOT DETECTED
HPIV4 P GENE NPH QL NAA+PROBE: NOT DETECTED
IMM GRANULOCYTES # BLD AUTO: 0.09 X10(3)/MCL (ref 0–0.04)
IMM GRANULOCYTES NFR BLD AUTO: 0.8 %
LYMPHOCYTES # BLD AUTO: 0.72 X10(3)/MCL (ref 0.6–4.6)
LYMPHOCYTES NFR BLD AUTO: 6.1 %
M PNEUMO IGA SER-ACNC: NOT DETECTED
MCH RBC QN AUTO: 31.1 PG (ref 27–31)
MCHC RBC AUTO-ENTMCNC: 34 MG/DL (ref 33–36)
MCV RBC AUTO: 91.3 FL (ref 80–94)
MONOCYTES # BLD AUTO: 0.28 X10(3)/MCL (ref 0.1–1.3)
MONOCYTES NFR BLD AUTO: 2.4 %
NEUTROPHILS # BLD AUTO: 10.7 X10(3)/MCL (ref 2.1–9.2)
NEUTROPHILS NFR BLD AUTO: 90.6 %
NRBC BLD AUTO-RTO: 0 %
PLATELET # BLD AUTO: 157 X10(3)/MCL (ref 130–400)
PMV BLD AUTO: 12.3 FL (ref 7.4–10.4)
POTASSIUM SERPL-SCNC: 4.6 MMOL/L (ref 3.5–5.1)
RBC # BLD AUTO: 4.15 X10(6)/MCL (ref 4.7–6.1)
RHINOVIRUS RNA SPEC NAA+PROBE: NOT DETECTED
RSV A 5' UTR RNA NPH QL NAA+PROBE: NOT DETECTED
SODIUM SERPL-SCNC: 134 MMOL/L (ref 136–145)
WBC # SPEC AUTO: 11.8 X10(3)/MCL (ref 4.5–11.5)

## 2022-09-11 PROCEDURE — 80048 BASIC METABOLIC PNL TOTAL CA: CPT | Performed by: INTERNAL MEDICINE

## 2022-09-11 PROCEDURE — 11000001 HC ACUTE MED/SURG PRIVATE ROOM

## 2022-09-11 PROCEDURE — 63600175 PHARM REV CODE 636 W HCPCS: Performed by: NURSE PRACTITIONER

## 2022-09-11 PROCEDURE — 25000003 PHARM REV CODE 250: Performed by: NURSE PRACTITIONER

## 2022-09-11 PROCEDURE — 25000003 PHARM REV CODE 250: Performed by: INTERNAL MEDICINE

## 2022-09-11 PROCEDURE — 63600175 PHARM REV CODE 636 W HCPCS: Performed by: INTERNAL MEDICINE

## 2022-09-11 PROCEDURE — 85025 COMPLETE CBC W/AUTO DIFF WBC: CPT | Performed by: INTERNAL MEDICINE

## 2022-09-11 PROCEDURE — 27000207 HC ISOLATION

## 2022-09-11 PROCEDURE — 36415 COLL VENOUS BLD VENIPUNCTURE: CPT | Performed by: INTERNAL MEDICINE

## 2022-09-11 RX ADMIN — COLCHICINE 1.2 MG: 0.6 TABLET, FILM COATED ORAL at 09:09

## 2022-09-11 RX ADMIN — SODIUM CHLORIDE, POTASSIUM CHLORIDE, SODIUM LACTATE AND CALCIUM CHLORIDE: 600; 310; 30; 20 INJECTION, SOLUTION INTRAVENOUS at 03:09

## 2022-09-11 RX ADMIN — METHYLPREDNISOLONE SODIUM SUCCINATE 40 MG: 40 INJECTION, POWDER, FOR SOLUTION INTRAMUSCULAR; INTRAVENOUS at 09:09

## 2022-09-11 RX ADMIN — ATORVASTATIN CALCIUM 20 MG: 10 TABLET, FILM COATED ORAL at 08:09

## 2022-09-11 RX ADMIN — AMLODIPINE BESYLATE 10 MG: 5 TABLET ORAL at 09:09

## 2022-09-11 RX ADMIN — HYDROCODONE BITARTRATE AND ACETAMINOPHEN 1 TABLET: 5; 325 TABLET ORAL at 04:09

## 2022-09-11 RX ADMIN — PIPERACILLIN AND TAZOBACTAM 4.5 G: 4; .5 INJECTION, POWDER, LYOPHILIZED, FOR SOLUTION INTRAVENOUS; PARENTERAL at 03:09

## 2022-09-11 RX ADMIN — PIPERACILLIN AND TAZOBACTAM 4.5 G: 4; .5 INJECTION, POWDER, LYOPHILIZED, FOR SOLUTION INTRAVENOUS; PARENTERAL at 06:09

## 2022-09-11 NOTE — PROGRESS NOTES
[facility]  Hospital Medicine Progress Note        Chief Complaint: Inpatient Follow-up for     HPI: 72 y.o. male with a PMHx of HTN, HLD, gout, anxiety, depression who presented to [facility] on 9/9/2022 as a transfer from [facility] for GI Services. He initially presented to the [facility] ED with c/o epigastric abdominal pain and diarrhea. Work-up done revealing WBC 23.46, lipase 533. CT abd/pelvis without contrast done at the [facility] ED revealed colitis; patchy infiltrate left lung base, renal calculi without obstruction. CT abd/pelvis with contrast revealed pancreatic mass suspicious for pancreatic neoplasm with pancreatic duct dilation. He was transferred to [facility] for GI services. Admitted to hospital medicine services. GI consulted. Started on IV abx. Blood cultures are pending. Repeat labs notable for WBC 18, hgb 13.2, hct 39.7, platelets 127, sed rate 63, sodium 133, CO2 21, albumin 2.9, CRP >169.    C diff has been negative GI consulted and following    Interval Hx:     Patient seen and examined this morning complains of 2 loose bowel movements did spiked temperature yesterday reports his toe swelling is better  Objective/physical exam:  General: In no acute distress, afebrile  Chest: Clear to auscultation bilaterally  Heart: RRR, +S1, S2, no appreciable murmur  Abdomen: Soft, nontender, BS +  MSK: Warm, no lower extremity edema, no clubbing or cyanosis  Neurologic: Alert and oriented x4,   VITAL SIGNS: 24 HRS MIN & MAX LAST   Temp  Min: 97.2 °F (36.2 °C)  Max: 100.6 °F (38.1 °C) 98.1 °F (36.7 °C)   BP  Min: 114/58  Max: 141/62 120/62   Pulse  Min: 60  Max: 89  71   Resp  Min: 16  Max: 20 16   SpO2  Min: 92 %  Max: 95 % (!) 93 %         Recent Labs   Lab 09/09/22  0500 09/10/22  0557 09/11/22  0459   WBC 18.0* 14.5* 11.8*   RBC 4.32* 3.78* 4.15*   HGB 13.2* 11.8* 12.9*   HCT 39.7* 34.9* 37.9*   MCV 91.9 92.3 91.3   MCH 30.6 31.2* 31.1*   MCHC 33.2 33.8 34.0   RDW  13.3 13.2 12.7   * 138 157   MPV 12.0* 12.5* 12.3*       Recent Labs   Lab 09/09/22  0500 09/10/22  0557 09/11/22  0459   * 134* 134*   K 3.8 3.8 4.6   CO2 21* 24 23   BUN 21.1 11.0 10.7   CREATININE 0.95 0.75 0.76   CALCIUM 8.8 8.0* 9.2   MG 2.40  --   --    ALBUMIN 2.9* 2.6*  --    ALKPHOS 99 79  --    ALT 25 18  --    AST 25 20  --    BILITOT 1.1 0.9  --           Microbiology Results (last 7 days)       Procedure Component Value Units Date/Time    Stool Culture [[id number]]  (Normal) Collected: 09/09/22 0916    Order Status: Completed Specimen: Stool Updated: 09/11/22 1141     Stool Culture Negative for Salmonella, Shigella, Campylobacter, Vibrio, Aeromonas, Pleisiomonas,Yersinia, or Shiga Toxin 1 and 2.    Blood Culture [[id number]]  (Normal) Collected: 09/09/22 0500    Order Status: Completed Specimen: Blood Updated: 09/11/22 0600     CULTURE, BLOOD ([facility]) No Growth At 48 Hours    Blood Culture [[id number]]  (Normal) Collected: 09/09/22 0500    Order Status: Completed Specimen: Blood Updated: 09/11/22 0600     CULTURE, BLOOD ([facility]) No Growth At 48 Hours    Clostridium Diff Toxin, A & B, EIA [[id number]]  (Normal) Collected: 09/09/22 1234    Order Status: Completed Specimen: Stool Updated: 09/09/22 1351     Clostridium Difficile GDH Antigen Negative     Clostridium Difficile Toxin A/B Negative             See below for Radiology    Scheduled Med:   amLODIPine  10 mg Oral Daily    atorvastatin  20 mg Oral QHS    colchicine  1.2 mg Oral Daily    methylPREDNISolone sodium succinate injection  40 mg Intravenous Daily    piperacillin-tazobactam (ZOSYN) IVPB  4.5 g Intravenous Q8H        Continuous Infusions:   lactated ringers 100 mL/hr at 09/11/22 1514        PRN Meds:  acetaminophen, albuterol-ipratropium, glucagon (human recombinant), glucose, glucose, HYDROcodone-acetaminophen, morphine, ondansetron       Assessment/Plan:  Pancreatic Mass with Pancreatic Duct Dilation  Acute Epigastric Abdominal  Pain  Colitis  Gout exacerbation  Possible LLL Pneumonia  Leukocytosis  Elevated Inflammatory Markers  Essential HTN  Hx of HLD, anxiety, depression     Plan:  Patient is on clear liquid diet we will advance as tolerated  Had 3 bowel movements, C diff negative stool for WBC positive will await stool PCR panel and stool cultures  Patient was given a dose of prednisone and colchicine and swelling and redness is better  Chest x-ray done yesterday showed atelectasis patient is on Zosyn and doxycycline I will discontinue doxycycline   Continue with LR at 100 cc an hour blood cultures negative until now   GI following possible plan for EUS outpatient will await further GI recommendations      Potential discharge in a.m. once we have the GI PCR results and stools are better  VTE prophylaxis: scd    Patient condition:  Stable/Fair/Guarded/ Serious/ Critical    Anticipated discharge and Disposition:         All diagnosis and differential diagnosis have been reviewed; assessment and plan has been documented; I have personally reviewed the labs and test results that are presently available; I have reviewed the patients medication list; I have reviewed the consulting providers response and recommendations. I have reviewed or attempted to review medical records based upon their availability    All of the patient's questions have been  addressed and answered. Patient's is agreeable to the above stated plan. I will continue to monitor closely and make adjustments to medical management as needed.  _____________________________________________________________________    Nutrition Status:    Radiology:  X-Ray Chest PA And Lateral  Narrative: EXAMINATION:  XR CHEST PA AND LATERAL    CLINICAL HISTORY:  ? pneumonia;    COMPARISON:  None    FINDINGS:  PA and lateral views of the chest show linear atelectasis in the left lung base without focal consolidation, pneumothorax or pleural effusion.  Cardiac silhouette and pulmonary vasculature  are normal.  No acute osseous findings.  Impression: No acute findings in the chest    Electronically signed by: Dawit Durant MD  Date:    09/10/2022  Time:    10:13      Shelli Prater MD   09/11/2022

## 2022-09-12 LAB
ANION GAP SERPL CALC-SCNC: 8 MEQ/L
BASOPHILS # BLD AUTO: 0.02 X10(3)/MCL (ref 0–0.2)
BASOPHILS NFR BLD AUTO: 0.1 %
BUN SERPL-MCNC: 8.8 MG/DL (ref 8.4–25.7)
CALCIUM SERPL-MCNC: 8.6 MG/DL (ref 8.8–10)
CHLORIDE SERPL-SCNC: 107 MMOL/L (ref 98–107)
CO2 SERPL-SCNC: 24 MMOL/L (ref 23–31)
CREAT SERPL-MCNC: 0.66 MG/DL (ref 0.73–1.18)
CREAT/UREA NIT SERPL: 13
EOSINOPHIL # BLD AUTO: 0 X10(3)/MCL (ref 0–0.9)
EOSINOPHIL NFR BLD AUTO: 0 %
ERYTHROCYTE [DISTWIDTH] IN BLOOD BY AUTOMATED COUNT: 12.8 % (ref 11.5–17)
GFR SERPLBLD CREATININE-BSD FMLA CKD-EPI: >60 MLS/MIN/1.73/M2
GLUCOSE SERPL-MCNC: 124 MG/DL (ref 82–115)
HCT VFR BLD AUTO: 35.2 % (ref 42–52)
HGB BLD-MCNC: 11.8 GM/DL (ref 14–18)
IMM GRANULOCYTES # BLD AUTO: 0.12 X10(3)/MCL (ref 0–0.04)
IMM GRANULOCYTES NFR BLD AUTO: 0.7 %
LYMPHOCYTES # BLD AUTO: 1.11 X10(3)/MCL (ref 0.6–4.6)
LYMPHOCYTES NFR BLD AUTO: 6.8 %
MCH RBC QN AUTO: 30.6 PG (ref 27–31)
MCHC RBC AUTO-ENTMCNC: 33.5 MG/DL (ref 33–36)
MCV RBC AUTO: 91.4 FL (ref 80–94)
MONOCYTES # BLD AUTO: 0.85 X10(3)/MCL (ref 0.1–1.3)
MONOCYTES NFR BLD AUTO: 5.2 %
NEUTROPHILS # BLD AUTO: 14.3 X10(3)/MCL (ref 2.1–9.2)
NEUTROPHILS NFR BLD AUTO: 87.2 %
NRBC BLD AUTO-RTO: 0 %
PLATELET # BLD AUTO: 175 X10(3)/MCL (ref 130–400)
PMV BLD AUTO: 12.3 FL (ref 7.4–10.4)
POTASSIUM SERPL-SCNC: 4 MMOL/L (ref 3.5–5.1)
RBC # BLD AUTO: 3.85 X10(6)/MCL (ref 4.7–6.1)
SODIUM SERPL-SCNC: 139 MMOL/L (ref 136–145)
WBC # SPEC AUTO: 16.4 X10(3)/MCL (ref 4.5–11.5)

## 2022-09-12 PROCEDURE — 25000003 PHARM REV CODE 250: Performed by: NURSE PRACTITIONER

## 2022-09-12 PROCEDURE — 63600175 PHARM REV CODE 636 W HCPCS: Performed by: NURSE PRACTITIONER

## 2022-09-12 PROCEDURE — 85025 COMPLETE CBC W/AUTO DIFF WBC: CPT | Performed by: INTERNAL MEDICINE

## 2022-09-12 PROCEDURE — 27000207 HC ISOLATION

## 2022-09-12 PROCEDURE — 80048 BASIC METABOLIC PNL TOTAL CA: CPT | Performed by: INTERNAL MEDICINE

## 2022-09-12 PROCEDURE — 11000001 HC ACUTE MED/SURG PRIVATE ROOM

## 2022-09-12 PROCEDURE — 25000003 PHARM REV CODE 250: Performed by: PHYSICIAN ASSISTANT

## 2022-09-12 PROCEDURE — 36415 COLL VENOUS BLD VENIPUNCTURE: CPT | Performed by: INTERNAL MEDICINE

## 2022-09-12 PROCEDURE — 25000003 PHARM REV CODE 250: Performed by: INTERNAL MEDICINE

## 2022-09-12 RX ORDER — SODIUM, POTASSIUM,MAG SULFATES 17.5-3.13G
1 SOLUTION, RECONSTITUTED, ORAL ORAL 2 TIMES DAILY
Status: COMPLETED | OUTPATIENT
Start: 2022-09-12 | End: 2022-09-13

## 2022-09-12 RX ADMIN — PIPERACILLIN AND TAZOBACTAM 4.5 G: 4; .5 INJECTION, POWDER, LYOPHILIZED, FOR SOLUTION INTRAVENOUS; PARENTERAL at 03:09

## 2022-09-12 RX ADMIN — SODIUM CHLORIDE, POTASSIUM CHLORIDE, SODIUM LACTATE AND CALCIUM CHLORIDE: 600; 310; 30; 20 INJECTION, SOLUTION INTRAVENOUS at 03:09

## 2022-09-12 RX ADMIN — SODIUM SULFATE, POTASSIUM SULFATE, MAGNESIUM SULFATE 354 ML: 17.5; 3.13; 1.6 SOLUTION, CONCENTRATE ORAL at 07:09

## 2022-09-12 RX ADMIN — PIPERACILLIN AND TAZOBACTAM 4.5 G: 4; .5 INJECTION, POWDER, LYOPHILIZED, FOR SOLUTION INTRAVENOUS; PARENTERAL at 01:09

## 2022-09-12 RX ADMIN — AMLODIPINE BESYLATE 10 MG: 5 TABLET ORAL at 09:09

## 2022-09-12 RX ADMIN — COLCHICINE 1.2 MG: 0.6 TABLET, FILM COATED ORAL at 09:09

## 2022-09-12 RX ADMIN — ATORVASTATIN CALCIUM 20 MG: 10 TABLET, FILM COATED ORAL at 09:09

## 2022-09-12 RX ADMIN — PIPERACILLIN AND TAZOBACTAM 4.5 G: 4; .5 INJECTION, POWDER, LYOPHILIZED, FOR SOLUTION INTRAVENOUS; PARENTERAL at 07:09

## 2022-09-12 NOTE — PROGRESS NOTES
"Agree with the below documentation. Colonoscopy tomorrow and likely home after.     Gastroenterology Progress Note      HPI:    WBC trending up today, but could be secondary to IV steroids given for gout. Multiple diarrheal stools today. Fever of 100.6 yesterday, which has resolved today. Pt has been on zosyn since admission and doxy   ROS:    Review of Systems   Constitutional:  Negative for fever, malaise/fatigue and weight loss.   Respiratory:  Negative for cough and shortness of breath.    Cardiovascular:  Negative for chest pain, palpitations and leg swelling.   Gastrointestinal:  Positive for diarrhea. Negative for abdominal pain, blood in stool, constipation, heartburn, melena, nausea and vomiting.   Musculoskeletal:  Negative for back pain and myalgias.   Skin:  Negative for rash.   Neurological:  Negative for speech change and focal weakness.   All other systems reviewed and are negative.      Vital Signs:  BP (!) 144/54   Pulse 69   Temp 97.8 °F (36.6 °C) (Oral)   Resp 18   Ht 5' 2.01" (1.575 m)   Wt 83.9 kg (184 lb 15.5 oz)   SpO2 95%   BMI 33.82 kg/m²   Body mass index is 33.82 kg/m².    Physical Exam:    Physical Exam  Vitals and nursing note reviewed.   Constitutional:       Appearance: Normal appearance.   HENT:      Head: Normocephalic and atraumatic.   Eyes:      General: No scleral icterus.     Extraocular Movements: Extraocular movements intact.   Cardiovascular:      Rate and Rhythm: Normal rate and regular rhythm.      Heart sounds: Normal heart sounds.   Pulmonary:      Effort: Pulmonary effort is normal. No respiratory distress.      Breath sounds: Normal breath sounds.   Abdominal:      General: Abdomen is flat. Bowel sounds are normal. There is no distension.      Palpations: Abdomen is soft.      Tenderness: There is no abdominal tenderness.   Musculoskeletal:         General: No swelling or deformity. Normal range of motion.      Right lower leg: No edema.      Left lower leg: No " edema.   Skin:     General: Skin is warm and dry.   Neurological:      General: No focal deficit present.      Mental Status: He is alert and oriented to person, place, and time.   Psychiatric:         Mood and Affect: Mood normal.         Behavior: Behavior normal.       Labs:  Recent Results (from the past 48 hour(s))   Basic Metabolic Panel    Collection Time: 09/11/22  4:59 AM   Result Value Ref Range    Sodium Level 134 (L) 136 - 145 mmol/L    Potassium Level 4.6 3.5 - 5.1 mmol/L    Chloride 103 98 - 107 mmol/L    Carbon Dioxide 23 23 - 31 mmol/L    Glucose Level 148 (H) 82 - 115 mg/dL    Blood Urea Nitrogen 10.7 8.4 - 25.7 mg/dL    Creatinine 0.76 0.73 - 1.18 mg/dL    BUN/Creatinine Ratio 14     Calcium Level Total 9.2 8.8 - 10.0 mg/dL    Anion Gap 8.0 mEq/L    eGFR >60 mls/min/1.73/m2   CBC with Differential    Collection Time: 09/11/22  4:59 AM   Result Value Ref Range    WBC 11.8 (H) 4.5 - 11.5 x10(3)/mcL    RBC 4.15 (L) 4.70 - 6.10 x10(6)/mcL    Hgb 12.9 (L) 14.0 - 18.0 gm/dL    Hct 37.9 (L) 42.0 - 52.0 %    MCV 91.3 80.0 - 94.0 fL    MCH 31.1 (H) 27.0 - 31.0 pg    MCHC 34.0 33.0 - 36.0 mg/dL    RDW 12.7 11.5 - 17.0 %    Platelet 157 130 - 400 x10(3)/mcL    MPV 12.3 (H) 7.4 - 10.4 fL    Neut % 90.6 %    Lymph % 6.1 %    Mono % 2.4 %    Eos % 0.0 %    Basophil % 0.1 %    Lymph # 0.72 0.6 - 4.6 x10(3)/mcL    Neut # 10.7 (H) 2.1 - 9.2 x10(3)/mcL    Mono # 0.28 0.1 - 1.3 x10(3)/mcL    Eos # 0.00 0 - 0.9 x10(3)/mcL    Baso # 0.01 0 - 0.2 x10(3)/mcL    IG# 0.09 (H) 0 - 0.04 x10(3)/mcL    IG% 0.8 %    NRBC% 0.0 %   Basic Metabolic Panel    Collection Time: 09/12/22  4:26 AM   Result Value Ref Range    Sodium Level 139 136 - 145 mmol/L    Potassium Level 4.0 3.5 - 5.1 mmol/L    Chloride 107 98 - 107 mmol/L    Carbon Dioxide 24 23 - 31 mmol/L    Glucose Level 124 (H) 82 - 115 mg/dL    Blood Urea Nitrogen 8.8 8.4 - 25.7 mg/dL    Creatinine 0.66 (L) 0.73 - 1.18 mg/dL    BUN/Creatinine Ratio 13     Calcium Level Total  "8.6 (L) 8.8 - 10.0 mg/dL    Anion Gap 8.0 mEq/L    eGFR >60 mls/min/1.73/m2   CBC with Differential    Collection Time: 09/12/22  4:26 AM   Result Value Ref Range    WBC 16.4 (H) 4.5 - 11.5 x10(3)/mcL    RBC 3.85 (L) 4.70 - 6.10 x10(6)/mcL    Hgb 11.8 (L) 14.0 - 18.0 gm/dL    Hct 35.2 (L) 42.0 - 52.0 %    MCV 91.4 80.0 - 94.0 fL    MCH 30.6 27.0 - 31.0 pg    MCHC 33.5 33.0 - 36.0 mg/dL    RDW 12.8 11.5 - 17.0 %    Platelet 175 130 - 400 x10(3)/mcL    MPV 12.3 (H) 7.4 - 10.4 fL    Neut % 87.2 %    Lymph % 6.8 %    Mono % 5.2 %    Eos % 0.0 %    Basophil % 0.1 %    Lymph # 1.11 0.6 - 4.6 x10(3)/mcL    Neut # 14.3 (H) 2.1 - 9.2 x10(3)/mcL    Mono # 0.85 0.1 - 1.3 x10(3)/mcL    Eos # 0.00 0 - 0.9 x10(3)/mcL    Baso # 0.02 0 - 0.2 x10(3)/mcL    IG# 0.12 (H) 0 - 0.04 x10(3)/mcL    IG% 0.7 %    NRBC% 0.0 %         Assessment/Plan:  73 y/o male unknown to our service transferred here from [place] with acute abd pain, n/v/d. Records obtained from paper chart and pt.     CT abd/pelv w/o contrast 9/8/22: focal colitis of splenic flexure of colon with moderate periserosal inflammatory stranding. Infectious vs. Ischemic colitis considered. Meghan left lung infiltrates, renal calculi without obstruction.     Ct abd/pelv w/ contrast 9/8/22: hypoenhancing small mass occupying the inferior head of the pancreas highly suspicious for neoplasm. Associated w/ pancreatic ductal dilatation     Pancreatic mass  - noted "small hypodense mass occupying the medial pancreatic head having maximal dimensions of 1.6 x 2.2 x 2.7 cm. Associated with mild abnormal dilation of the pancreatic duct. No evidence of pancreatitis." This finding ws not evident on noncontrast CT.   - will set up outpatient EUS     Acute pancreatitis  - by way of elevated lipase 533 and pain, despite negative CT findings.  - pain much improved.      Colitis w/ diarrhea  - CT reveals focal colitis of splenic flexure  - s/p abx inpatient 3 weeks ago.  - on zosyn and doxy per " hospital medicine  - c. Diff and stool culture negative. pt spiked a fever of 100.6 9/11/22  - CRP >160.0, stool WBC positive.   - WBC 16.4    - will plan for colonoscopy tomorrow inpatient. Prep tonight. Clear liquids today. NPO after midnight.       WHITLEY WilhelmC  [facility], [facility]     Anal fistula    Hemorrhoids, internal    HIV (human immunodeficiency virus infection)    Hyperlipidemia    Hypertension    Marijuana smoker    Pneumonia of right lower lobe due to infectious organism

## 2022-09-12 NOTE — PROGRESS NOTES
[facility] [place] [facility] [place] [facility]  Hospital Medicine Progress Note        Chief Complaint: Inpatient Follow-up for     HPI: 72 y.o. male with a PMHx of HTN, HLD, gout, anxiety, depression who presented to [facility] on 9/9/2022 as a transfer from [facility] for GI Services. He initially presented to the [facility] ED with c/o epigastric abdominal pain and diarrhea. Work-up done revealing WBC 23.46, lipase 533. CT abd/pelvis without contrast done at the [facility] ED revealed colitis; patchy infiltrate left lung base, renal calculi without obstruction. CT abd/pelvis with contrast revealed pancreatic mass suspicious for pancreatic neoplasm with pancreatic duct dilation. He was transferred to [facility] for GI services. Admitted to hospital medicine services. GI consulted. Started on IV abx. Blood cultures are pending. Repeat labs notable for WBC 18, hgb 13.2, hct 39.7, platelets 127, sed rate 63, sodium 133, CO2 21, albumin 2.9, CRP >169.    C diff has been negative GI consulted and following    Interval Hx:     Patient seen and examined this morning still having loose stools otherwise afebrile     Objective/physical exam:  General: In no acute distress, afebrile  Chest: Clear to auscultation bilaterally  Heart: RRR, +S1, S2, no appreciable murmur  Abdomen: Soft, nontender, BS +  MSK: Warm, no lower extremity edema, no clubbing or cyanosis  Neurologic: Alert and oriented x4,   VITAL SIGNS: 24 HRS MIN & MAX LAST   Temp  Min: 97.6 °F (36.4 °C)  Max: 98.4 °F (36.9 °C) 97.6 °F (36.4 °C)   BP  Min: 104/65  Max: 126/68 120/60   Pulse  Min: 60  Max: 82  82   Resp  Min: 16  Max: 19 18   SpO2  Min: 92 %  Max: 95 % (!) 94 %         Recent Labs   Lab 09/10/22  0557 09/11/22  0459 09/12/22  0426   WBC 14.5* 11.8* 16.4*   RBC 3.78* 4.15* 3.85*   HGB 11.8* 12.9* 11.8*   HCT 34.9* 37.9* 35.2*   MCV 92.3 91.3 91.4   MCH 31.2* 31.1* 30.6   MCHC 33.8 34.0 33.5   RDW 13.2 12.7 12.8    157 175   MPV 12.5* 12.3* 12.3*        Recent Labs   Lab 09/09/22  0500 09/10/22  0557 09/11/22  0459 09/12/22  0426   * 134* 134* 139   K 3.8 3.8 4.6 4.0   CO2 21* 24 23 24   BUN 21.1 11.0 10.7 8.8   CREATININE 0.95 0.75 0.76 0.66*   CALCIUM 8.8 8.0* 9.2 8.6*   MG 2.40  --   --   --    ALBUMIN 2.9* 2.6*  --   --    ALKPHOS 99 79  --   --    ALT 25 18  --   --    AST 25 20  --   --    BILITOT 1.1 0.9  --   --           Microbiology Results (last 7 days)       Procedure Component Value Units Date/Time    Blood Culture [[id number]]  (Normal) Collected: 09/09/22 0500    Order Status: Completed Specimen: Blood Updated: 09/12/22 0600     CULTURE, BLOOD ([facility]) No Growth At 72 Hours    Blood Culture [[id number]]  (Normal) Collected: 09/09/22 0500    Order Status: Completed Specimen: Blood Updated: 09/12/22 0600     CULTURE, BLOOD ([facility]) No Growth At 72 Hours    Stool Culture [[id number]]  (Normal) Collected: 09/09/22 0916    Order Status: Completed Specimen: Stool Updated: 09/11/22 1141     Stool Culture Negative for Salmonella, Shigella, Campylobacter, Vibrio, Aeromonas, Pleisiomonas,Yersinia, or Shiga Toxin 1 and 2.    Clostridium Diff Toxin, A & B, EIA [[id number]]  (Normal) Collected: 09/09/22 1234    Order Status: Completed Specimen: Stool Updated: 09/09/22 1351     Clostridium Difficile GDH Antigen Negative     Clostridium Difficile Toxin A/B Negative             See below for Radiology    Scheduled Med:   amLODIPine  10 mg Oral Daily    atorvastatin  20 mg Oral QHS    colchicine  1.2 mg Oral Daily    methylPREDNISolone sodium succinate injection  40 mg Intravenous Daily    piperacillin-tazobactam (ZOSYN) IVPB  4.5 g Intravenous Q8H        Continuous Infusions:   lactated ringers 100 mL/hr at 09/11/22 1514        PRN Meds:  acetaminophen, albuterol-ipratropium, glucagon (human recombinant), glucose, glucose, HYDROcodone-acetaminophen, morphine, ondansetron       Assessment/Plan:  Pancreatic Mass with Pancreatic Duct Dilation  Acute Epigastric  Abdominal Pain  Colitis  Gout exacerbation  Possible LLL Pneumonia  Leukocytosis  Elevated Inflammatory Markers  Essential HTN  Hx of HLD, anxiety, depression     Plan:  Patient's white cell count trended up slightly but patient did receive steroids because of gout flare up   Still having loose stool awaiting GI PCR, GI following   On Zosyn, we are awaiting further GI recommendations about possible colonoscopy?  C diff negative stool for WBC positive will await stool PCR panel and stool cultures  Patient was given a dose of prednisone and colchicine and swelling and redness is better  Continue with LR at 100 cc an hour blood cultures negative until now   GI following possible plan for EUS outpatient will await further GI recommendations        VTE prophylaxis: scd    Patient condition:  Stable/Fair/Guarded/ Serious/ Critical    Anticipated discharge and Disposition:         All diagnosis and differential diagnosis have been reviewed; assessment and plan has been documented; I have personally reviewed the labs and test results that are presently available; I have reviewed the patients medication list; I have reviewed the consulting providers response and recommendations. I have reviewed or attempted to review medical records based upon their availability    All of the patient's questions have been  addressed and answered. Patient's is agreeable to the above stated plan. I will continue to monitor closely and make adjustments to medical management as needed.  _____________________________________________________________________    Nutrition Status:    Radiology:  X-Ray Chest PA And Lateral  Narrative: EXAMINATION:  XR CHEST PA AND LATERAL    CLINICAL HISTORY:  ? pneumonia;    COMPARISON:  None    FINDINGS:  PA and lateral views of the chest show linear atelectasis in the left lung base without focal consolidation, pneumothorax or pleural effusion.  Cardiac silhouette and pulmonary vasculature are normal.  No acute  osseous findings.  Impression: No acute findings in the chest    Electronically signed by: Dawit Durant MD  Date:    09/10/2022  Time:    10:13      Shelli Prater MD   09/12/2022

## 2022-09-13 ENCOUNTER — ANESTHESIA (OUTPATIENT)
Dept: ENDOSCOPY | Facility: HOSPITAL | Age: 72
DRG: 440 | End: 2022-09-13
Payer: MEDICARE

## 2022-09-13 ENCOUNTER — ANESTHESIA EVENT (OUTPATIENT)
Dept: ENDOSCOPY | Facility: HOSPITAL | Age: 72
DRG: 440 | End: 2022-09-13
Payer: MEDICARE

## 2022-09-13 VITALS
RESPIRATION RATE: 16 BRPM | TEMPERATURE: 98 F | BODY MASS INDEX: 34.03 KG/M2 | SYSTOLIC BLOOD PRESSURE: 127 MMHG | DIASTOLIC BLOOD PRESSURE: 68 MMHG | OXYGEN SATURATION: 94 % | HEART RATE: 65 BPM | HEIGHT: 62 IN | WEIGHT: 184.94 LBS

## 2022-09-13 PROBLEM — K86.89 PANCREATIC MASS: Status: ACTIVE | Noted: 2022-09-13

## 2022-09-13 LAB
BASOPHILS # BLD AUTO: 0.03 X10(3)/MCL (ref 0–0.2)
BASOPHILS NFR BLD AUTO: 0.2 %
EOSINOPHIL # BLD AUTO: 0.02 X10(3)/MCL (ref 0–0.9)
EOSINOPHIL NFR BLD AUTO: 0.2 %
ERYTHROCYTE [DISTWIDTH] IN BLOOD BY AUTOMATED COUNT: 13.3 % (ref 11.5–17)
HCT VFR BLD AUTO: 40 % (ref 42–52)
HGB BLD-MCNC: 13.2 GM/DL (ref 14–18)
LYMPHOCYTES # BLD AUTO: 1.6 X10(3)/MCL (ref 0.6–4.6)
LYMPHOCYTES NFR BLD AUTO: 12.4 %
MCH RBC QN AUTO: 30.8 PG (ref 27–31)
MCHC RBC AUTO-ENTMCNC: 33 MG/DL (ref 33–36)
MCV RBC AUTO: 93.5 FL (ref 80–94)
MONOCYTES # BLD AUTO: 1.15 X10(3)/MCL (ref 0.1–1.3)
MONOCYTES NFR BLD AUTO: 8.9 %
NEUTROPHILS # BLD AUTO: 10 X10(3)/MCL (ref 2.1–9.2)
NEUTROPHILS NFR BLD AUTO: 77.8 %
PLATELET # BLD AUTO: 219 X10(3)/MCL (ref 130–400)
PMV BLD AUTO: 11.9 FL (ref 7.4–10.4)
RBC # BLD AUTO: 4.28 X10(6)/MCL (ref 4.7–6.1)
WBC # SPEC AUTO: 12.9 X10(3)/MCL (ref 4.5–11.5)

## 2022-09-13 PROCEDURE — 36415 COLL VENOUS BLD VENIPUNCTURE: CPT | Performed by: INTERNAL MEDICINE

## 2022-09-13 PROCEDURE — 63600175 PHARM REV CODE 636 W HCPCS: Performed by: NURSE PRACTITIONER

## 2022-09-13 PROCEDURE — 85025 COMPLETE CBC W/AUTO DIFF WBC: CPT | Performed by: INTERNAL MEDICINE

## 2022-09-13 PROCEDURE — 45380 COLONOSCOPY AND BIOPSY: CPT | Performed by: INTERNAL MEDICINE

## 2022-09-13 PROCEDURE — 25000003 PHARM REV CODE 250: Performed by: PHYSICIAN ASSISTANT

## 2022-09-13 PROCEDURE — 25000003 PHARM REV CODE 250: Performed by: NURSE ANESTHETIST, CERTIFIED REGISTERED

## 2022-09-13 PROCEDURE — 63600175 PHARM REV CODE 636 W HCPCS: Performed by: NURSE ANESTHETIST, CERTIFIED REGISTERED

## 2022-09-13 PROCEDURE — 27201423 OPTIME MED/SURG SUP & DEVICES STERILE SUPPLY: Performed by: INTERNAL MEDICINE

## 2022-09-13 PROCEDURE — 37000008 HC ANESTHESIA 1ST 15 MINUTES: Performed by: INTERNAL MEDICINE

## 2022-09-13 PROCEDURE — 25000003 PHARM REV CODE 250: Performed by: NURSE PRACTITIONER

## 2022-09-13 PROCEDURE — 25000003 PHARM REV CODE 250: Performed by: INTERNAL MEDICINE

## 2022-09-13 PROCEDURE — 37000009 HC ANESTHESIA EA ADD 15 MINS: Performed by: INTERNAL MEDICINE

## 2022-09-13 RX ORDER — METRONIDAZOLE 500 MG/1
500 TABLET ORAL EVERY 8 HOURS
Qty: 15 TABLET | Refills: 0 | Status: SHIPPED | OUTPATIENT
Start: 2022-09-13 | End: 2022-09-18

## 2022-09-13 RX ORDER — LIDOCAINE HYDROCHLORIDE 20 MG/ML
INJECTION, SOLUTION EPIDURAL; INFILTRATION; INTRACAUDAL; PERINEURAL
Status: DISCONTINUED
Start: 2022-09-13 | End: 2022-09-13 | Stop reason: HOSPADM

## 2022-09-13 RX ORDER — SODIUM CHLORIDE, SODIUM GLUCONATE, SODIUM ACETATE, POTASSIUM CHLORIDE AND MAGNESIUM CHLORIDE 30; 37; 368; 526; 502 MG/100ML; MG/100ML; MG/100ML; MG/100ML; MG/100ML
1000 INJECTION, SOLUTION INTRAVENOUS CONTINUOUS
Status: CANCELLED | OUTPATIENT
Start: 2022-09-13 | End: 2022-10-13

## 2022-09-13 RX ORDER — DIPHENHYDRAMINE HYDROCHLORIDE 50 MG/ML
25 INJECTION INTRAMUSCULAR; INTRAVENOUS EVERY 6 HOURS PRN
Status: CANCELLED | OUTPATIENT
Start: 2022-09-13

## 2022-09-13 RX ORDER — CIPROFLOXACIN 500 MG/1
500 TABLET ORAL EVERY 12 HOURS
Status: DISCONTINUED | OUTPATIENT
Start: 2022-09-13 | End: 2022-09-13 | Stop reason: HOSPADM

## 2022-09-13 RX ORDER — PROPOFOL 10 MG/ML
VIAL (ML) INTRAVENOUS
Status: DISCONTINUED | OUTPATIENT
Start: 2022-09-13 | End: 2022-09-13

## 2022-09-13 RX ORDER — METRONIDAZOLE 500 MG/1
500 TABLET ORAL EVERY 8 HOURS
Status: DISCONTINUED | OUTPATIENT
Start: 2022-09-13 | End: 2022-09-13 | Stop reason: HOSPADM

## 2022-09-13 RX ORDER — LIDOCAINE HYDROCHLORIDE 10 MG/ML
1 INJECTION, SOLUTION EPIDURAL; INFILTRATION; INTRACAUDAL; PERINEURAL ONCE
Status: CANCELLED | OUTPATIENT
Start: 2022-09-13 | End: 2022-09-13

## 2022-09-13 RX ORDER — ONDANSETRON 2 MG/ML
4 INJECTION INTRAMUSCULAR; INTRAVENOUS DAILY PRN
Status: CANCELLED | OUTPATIENT
Start: 2022-09-13

## 2022-09-13 RX ORDER — LIDOCAINE HYDROCHLORIDE 20 MG/ML
INJECTION INTRAVENOUS
Status: DISCONTINUED | OUTPATIENT
Start: 2022-09-13 | End: 2022-09-13

## 2022-09-13 RX ORDER — CIPROFLOXACIN 500 MG/1
500 TABLET ORAL EVERY 12 HOURS
Qty: 10 TABLET | Refills: 0 | Status: SHIPPED | OUTPATIENT
Start: 2022-09-13 | End: 2022-09-18

## 2022-09-13 RX ORDER — ACETAMINOPHEN 10 MG/ML
1000 INJECTION, SOLUTION INTRAVENOUS ONCE
Status: CANCELLED | OUTPATIENT
Start: 2022-09-13 | End: 2022-09-13

## 2022-09-13 RX ADMIN — SODIUM CHLORIDE, SODIUM GLUCONATE, SODIUM ACETATE, POTASSIUM CHLORIDE AND MAGNESIUM CHLORIDE: 526; 502; 368; 37; 30 INJECTION, SOLUTION INTRAVENOUS at 08:09

## 2022-09-13 RX ADMIN — LIDOCAINE HYDROCHLORIDE 100 MG: 20 INJECTION INTRAVENOUS at 08:09

## 2022-09-13 RX ADMIN — AMLODIPINE BESYLATE 10 MG: 5 TABLET ORAL at 10:09

## 2022-09-13 RX ADMIN — SODIUM SULFATE, POTASSIUM SULFATE, MAGNESIUM SULFATE 354 ML: 17.5; 3.13; 1.6 SOLUTION, CONCENTRATE ORAL at 04:09

## 2022-09-13 RX ADMIN — PROPOFOL 20 MG: 10 INJECTION, EMULSION INTRAVENOUS at 08:09

## 2022-09-13 RX ADMIN — PROPOFOL 30 MG: 10 INJECTION, EMULSION INTRAVENOUS at 08:09

## 2022-09-13 RX ADMIN — METRONIDAZOLE 500 MG: 500 TABLET ORAL at 01:09

## 2022-09-13 RX ADMIN — COLCHICINE 1.2 MG: 0.6 TABLET, FILM COATED ORAL at 10:09

## 2022-09-13 RX ADMIN — PROPOFOL 60 MG: 10 INJECTION, EMULSION INTRAVENOUS at 08:09

## 2022-09-13 RX ADMIN — PIPERACILLIN AND TAZOBACTAM 4.5 G: 4; .5 INJECTION, POWDER, LYOPHILIZED, FOR SOLUTION INTRAVENOUS; PARENTERAL at 12:09

## 2022-09-13 RX ADMIN — CIPROFLOXACIN 500 MG: 500 TABLET, FILM COATED ORAL at 01:09

## 2022-09-13 RX ADMIN — PIPERACILLIN AND TAZOBACTAM 4.5 G: 4; .5 INJECTION, POWDER, LYOPHILIZED, FOR SOLUTION INTRAVENOUS; PARENTERAL at 06:09

## 2022-09-13 NOTE — TRANSFER OF CARE
"Anesthesia Transfer of Care Note    Patient: Yosvany Driver    Procedure(s) Performed: Procedure(s) (LRB):  COLONOSCOPY, WITH 1 OR MORE BIOPSIES (N/A)    Patient location: GI    Anesthesia Type: general    Transport from OR: Transported from OR on room air with adequate spontaneous ventilation    Post pain: adequate analgesia    Post assessment: no apparent anesthetic complications and tolerated procedure well    Post vital signs: stable    Level of consciousness: sedated    Nausea/Vomiting: no nausea/vomiting    Complications: none    Transfer of care protocol was followed      Last vitals:   Visit Vitals  BP (!) 106/51 (BP Location: Right arm, Patient Position: Lying)   Pulse 63   Temp 36.4 °C (97.5 °F)   Resp 17   Ht 5' 2.01" (1.575 m)   Wt 83.9 kg (184 lb 15.5 oz)   SpO2 96%   BMI 33.82 kg/m²     "

## 2022-09-13 NOTE — PROGRESS NOTES
Inpatient Nutrition Assessment    Admit Date: 9/9/2022   Total duration of encounter: 4 days     Nutrition Recommendation/Prescription     - Advance diet as tolerated per MD. Goal Diet: Low Residue Diet.   - Boost Plus TID for additional nourishment with diet advancement of full liquids or solids; provides 360 kcal and 14 gm protein per container.   - Monitor wt, labs, and intake.     Communication of Recommendations: reviewed with nurse    Nutrition Assessment     Malnutrition Assessment/Nutrition-Focused Physical Exam    Malnutrition in the context of acute illness or injury  Degree of Malnutrition: non-severe (moderate) malnutrition  Energy Intake: < 75% of estimated energy requirement for > 7 days  Interpretation of Weight Loss: >2% in 1 week  Body Fat:moderate depletion  Area of Body Fat Loss: orbital region   Muscle Mass Loss: mild depletion  Area of Muscle Mass Loss: temple region - temporalis muscle  Fluid Accumulation: unable to obtain  Edema: unable to obtain   Reduced  Strength: unable to obtain  A minimum of two characteristics is recommended for diagnosis of either severe or non-severe malnutrition.    Chart Review    Reason Seen: malnutrition screening tool and follow-up    Diagnosis:  Pancreatic Mass with Pancreatic Duct Dilation  Acute Epigastric Abdominal Pain  Colitis  Possible LLL Pneumonia  Leukocytosis  Elevated Inflammatory Markers  Essential HTN    Relevant Medical History:  HTN, HLD, gout, anxiety, depression     Nutrition-Related Medications: LR, zofran PRN  Calorie Containing IV Medications: no significant kcals from medications at this time    Nutrition-Related Labs:  9/9: Na 133, Glu 108, GFR>60  9/12: Glu 124, GFR>60    Diet/PN Order: Diet NPO Except for: Medication  Oral Supplement Order: none at this time  Tube Feeding Order: none at this time  Appetite/Oral Intake: NPO/NPO  Factors Affecting Nutritional Intake: NPO  Food/Mu-ism/Cultural Preferences: none reported      "  Wound(s):   none noted    Comments    22: Pt reports decreased appetite; denies n/v; reports decreased appetite for the past 2-3 weeks with wt loss.     22: Pt NPO for colonoscopy today.     Anthropometrics    Height: 5' 2.01" (157.5 cm)    Last Weight: 83.9 kg (184 lb 15.5 oz) (22 1305) Weight Method: Stated  BMI (Calculated): 33.8  BMI Classification: obese grade I (BMI 30-34.9)        Ideal Body Weight (IBW), Male: 118.06 lb  % Ideal Body Weight, Male (lb): 156.67 %           Usual Body Weight (UBW), k.5 kg  % Usual Body Weight: 93.94  Usual Weight Provided By: patient    Wt Readings from Last 5 Encounters:   22 83.9 kg (184 lb 15.5 oz)     Weight Change(s) Since Admission:  Admit Weight: 83.9 kg (185 lb) (22 0214)  22: 83.9 kg     Estimated Needs    Weight Used For Calorie Calculations: 83.9 kg (184 lb 15.5 oz)  Energy Calorie Requirements (kcal): 1921 kcal (MSJ x 1.3 SF)  Energy Need Method: [place]-St Jeor  Weight Used For Protein Calculations: 83.9 kg (184 lb 15.5 oz)  Protein Requirements: 126 gm (1.5 g/kg)  Fluid Requirements (mL): 1921  Temp: 97.5 °F (36.4 °C)       Enteral Nutrition    Patient not receiving enteral nutrition at this time.    Parenteral Nutrition    Patient not receiving parenteral nutrition support at this time.    Evaluation of Received Nutrient Intake    Calories: not meeting estimated needs  Protein: not meeting estimated needs    Patient Education    Not applicable.    Nutrition Diagnosis     PES: Malnutrition related to insufficient energy intake as evidenced by <75% est energy requirements >7 days, >2% wt loss x 1 week, physical evidence of mild muscle and fat wasting. (continues)    Interventions/Goals     Intervention(s): modified composition of meals/snacks and collaboration with other providers  Goal: Meet greater than 75% of nutritional needs by follow-up. (goal not met)    Monitoring & Evaluation     Dietitian will monitor food and " beverage intake and weight change.  Nutrition Risk/Follow-Up: high (follow-up in 1-4 days)

## 2022-09-13 NOTE — ANESTHESIA PREPROCEDURE EVALUATION
09/13/2022  Yosvany Driver is a 72 y.o. male with diarrhea for colonoscopy today.  He is feeling well otherwise, denies cardiopulmonary complaints.      Pre-op Assessment    I have reviewed the Patient Summary Reports.     I have reviewed the Nursing Notes. I have reviewed the NPO Status.   I have reviewed the Medications.     Review of Systems  Anesthesia Hx:  No problems with previous Anesthesia  Denies Family Hx of Anesthesia complications.   Denies Personal Hx of Anesthesia complications.   Social:  Smoker, Alcohol Use    Cardiovascular:  Cardiovascular Normal Exercise tolerance: good     Pulmonary:  Pulmonary Normal        Physical Exam  General: Well nourished, Cooperative, Alert and Oriented    Airway:  Mallampati: II   Mouth Opening: Normal  TM Distance: Normal  Tongue: Normal  Neck ROM: Normal ROM    Chest/Lungs:  Clear to auscultation, Normal Respiratory Rate    Heart:  Rate: Normal  Rhythm: Regular Rhythm        Anesthesia Plan  Type of Anesthesia, risks & benefits discussed:    Anesthesia Type: Gen Natural Airway  Intra-op Monitoring Plan: Standard ASA Monitors  Post Op Pain Control Plan: multimodal analgesia  Induction:  IV  Informed Consent: Informed consent signed with the Patient and all parties understand the risks and agree with anesthesia plan.  All questions answered.   ASA Score: 2  Day of Surgery Review of History & Physical: H&P Update referred to the surgeon/provider.    Ready For Surgery From Anesthesia Perspective.     .

## 2022-09-13 NOTE — PLAN OF CARE
Problem: Adult Inpatient Plan of Care  Goal: Plan of Care Review  Outcome: Ongoing, Progressing  Goal: Patient-Specific Goal (Individualized)  Outcome: Ongoing, Progressing  Goal: Absence of Hospital-Acquired Illness or Injury  Outcome: Ongoing, Progressing  Goal: Optimal Comfort and Wellbeing  Outcome: Ongoing, Progressing  Goal: Readiness for Transition of Care  Outcome: Ongoing, Progressing     Problem: Infection  Goal: Absence of Infection Signs and Symptoms  Outcome: Ongoing, Progressing     Problem: Adult Inpatient Plan of Care  Goal: Plan of Care Review  Outcome: Ongoing, Progressing  Goal: Patient-Specific Goal (Individualized)  Outcome: Ongoing, Progressing  Goal: Absence of Hospital-Acquired Illness or Injury  Outcome: Ongoing, Progressing  Goal: Optimal Comfort and Wellbeing  Outcome: Ongoing, Progressing  Goal: Readiness for Transition of Care  Outcome: Ongoing, Progressing     Problem: Infection  Goal: Absence of Infection Signs and Symptoms  Outcome: Ongoing, Progressing

## 2022-09-13 NOTE — PROVATION PATIENT INSTRUCTIONS
Discharge Summary/Instructions after an Endoscopic Procedure  Patient Name: Yosvany Driver  Patient MRN: [id number]  Patient YOB: 1950 Tuesday, September 13, 2022  Nixon Blackburn MD  Dear patient,  As a result of recent federal legislation (The Federal Cures Act), you may   receive lab or pathology results from your procedure in your [facility]   account before your physician is able to contact you. Your physician or   their representative will relay the results to you with their   recommendations at their soonest availability.  Thank you,  RESTRICTIONS:  During your procedure today, you received medications for sedation.  These   medications may affect your judgment, balance and coordination.  Therefore,   for 24 hours, you have the following restrictions:   - DO NOT drive a car, operate machinery, make legal/financial decisions,   sign important papers or drink alcohol.    ACTIVITY:  Today: no heavy lifting, straining or running due to procedural   sedation/anesthesia.  The following day: return to full activity including work.  DIET:  Eat and drink normally unless instructed otherwise.     TREATMENT FOR COMMON SIDE EFFECTS:  - Mild abdominal pain, nausea, belching, bloating or excessive gas:  rest,   eat lightly and use a heating pad.  - Sore Throat: treat with throat lozenges and/or gargle with warm salt   water.  - Because air was used during the procedure, expelling large amounts of air   from your rectum or belching is normal.  - If a bowel prep was taken, you may not have a bowel movement for 1-3 days.    This is normal.  SYMPTOMS TO WATCH FOR AND REPORT TO YOUR PHYSICIAN:  1. Abdominal pain or bloating, other than gas cramps.  2. Chest pain.  3. Back pain.  4. Signs of infection such as: chills or fever occurring within 24 hours   after the procedure.  5. Rectal bleeding, which would show as bright red, maroon, or black stools.   (A tablespoon of blood from the rectum is not serious, especially  if   hemorrhoids are present.)  6. Vomiting.  7. Weakness or dizziness.  GO DIRECTLY TO THE NEAREST EMERGENCY ROOM IF YOU HAVE ANY OF THE FOLLOWING:      Difficulty breathing              Chills and/or fever over 101 F   Persistent vomiting and/or vomiting blood   Severe abdominal pain   Severe chest pain   Black, tarry stools   Bleeding- more than one tablespoon   Any other symptom or condition that you feel may need urgent attention  Your doctor recommends these additional instructions:  If any biopsies were taken, your doctors clinic will contact you in 1 to 2   weeks with any results.  - [facility].   - Repeat colonoscopy in 5 years for surveillance.   - The findings and recommendations were discussed with the patient.  For questions, problems or results please call your physician - Nixon Blackburn MD at Work:  [phone].  [facility], EMERGENCY ROOM PHONE NUMBER: [phone]  IF A COMPLICATION OR EMERGENCY SITUATION ARISES AND YOU ARE UNABLE TO REACH   YOUR PHYSICIAN - GO DIRECTLY TO THE EMERGENCY ROOM.  MD Nixon Mcintyre MD  9/13/2022 8:51:20 AM  This report has been verified and signed electronically.  Dear patient,  As a result of recent federal legislation (The Federal Cures Act), you may   receive lab or pathology results from your procedure in your [facility]   account before your physician is able to contact you. Your physician or   their representative will relay the results to you with their   recommendations at their soonest availability.  Thank you,  PROVATION

## 2022-09-13 NOTE — PLAN OF CARE
Colonoscopy normal. Biopsies taken to rule out microscopic colitis.     Ok to d/c from GI standpoint on cipro/flagyl PO.

## 2022-09-13 NOTE — PROGRESS NOTES
[facility]  Hospital Medicine Progress Note        Chief Complaint: Inpatient Follow-up for     HPI: 72 y.o. male with a PMHx of HTN, HLD, gout, anxiety, depression who presented to [facility] on 9/9/2022 as a transfer from [facility] for GI Services. He initially presented to the [facility] ED with c/o epigastric abdominal pain and diarrhea. Work-up done revealing WBC 23.46, lipase 533. CT abd/pelvis without contrast done at the [facility] ED revealed colitis; patchy infiltrate left lung base, renal calculi without obstruction. CT abd/pelvis with contrast revealed pancreatic mass suspicious for pancreatic neoplasm with pancreatic duct dilation. He was transferred to [facility] for GI services. Admitted to hospital medicine services. GI consulted. Started on IV abx. Blood cultures are pending. Repeat labs notable for WBC 18, hgb 13.2, hct 39.7, platelets 127, sed rate 63, sodium 133, CO2 21, albumin 2.9, CRP >169.    C diff has been negative GI consulted and following    Interval Hx:     Patient seen and examined this morning colonoscopy today   Objective/physical exam:  General: In no acute distress, afebrile  Chest: Clear to auscultation bilaterally  Heart: RRR, +S1, S2, no appreciable murmur  Abdomen: Soft, nontender, BS +  MSK: Warm, no lower extremity edema, no clubbing or cyanosis  Neurologic: Alert and oriented x4,   VITAL SIGNS: 24 HRS MIN & MAX LAST   Temp  Min: 97.5 °F (36.4 °C)  Max: 98 °F (36.7 °C) 97.5 °F (36.4 °C)   BP  Min: 124/67  Max: 144/74 (!) 144/74   Pulse  Min: 59  Max: 69  67   Resp  Min: 16  Max: 21 (!) 21   SpO2  Min: 94 %  Max: 97 % 96 %         Recent Labs   Lab 09/11/22  0459 09/12/22  0426 09/13/22  0401   WBC 11.8* 16.4* 12.9*   RBC 4.15* 3.85* 4.28*   HGB 12.9* 11.8* 13.2*   HCT 37.9* 35.2* 40.0*   MCV 91.3 91.4 93.5   MCH 31.1* 30.6 30.8   MCHC 34.0 33.5 33.0   RDW 12.7 12.8 13.3    175 219   MPV 12.3* 12.3* 11.9*       Recent Labs   Lab  09/09/22  0500 09/10/22  0557 09/11/22  0459 09/12/22  0426   * 134* 134* 139   K 3.8 3.8 4.6 4.0   CO2 21* 24 23 24   BUN 21.1 11.0 10.7 8.8   CREATININE 0.95 0.75 0.76 0.66*   CALCIUM 8.8 8.0* 9.2 8.6*   MG 2.40  --   --   --    ALBUMIN 2.9* 2.6*  --   --    ALKPHOS 99 79  --   --    ALT 25 18  --   --    AST 25 20  --   --    BILITOT 1.1 0.9  --   --           Microbiology Results (last 7 days)       Procedure Component Value Units Date/Time    Blood Culture [[id number]]  (Normal) Collected: 09/09/22 0500    Order Status: Completed Specimen: Blood Updated: 09/13/22 0600     CULTURE, BLOOD ([facility]) No Growth At 96 Hours    Blood Culture [[id number]]  (Normal) Collected: 09/09/22 0500    Order Status: Completed Specimen: Blood Updated: 09/13/22 0600     CULTURE, BLOOD ([facility]) No Growth At 96 Hours    Stool Culture [[id number]]  (Normal) Collected: 09/09/22 0916    Order Status: Completed Specimen: Stool Updated: 09/11/22 1141     Stool Culture Negative for Salmonella, Shigella, Campylobacter, Vibrio, Aeromonas, Pleisiomonas,Yersinia, or Shiga Toxin 1 and 2.    Clostridium Diff Toxin, A & B, EIA [[id number]]  (Normal) Collected: 09/09/22 1234    Order Status: Completed Specimen: Stool Updated: 09/09/22 1351     Clostridium Difficile GDH Antigen Negative     Clostridium Difficile Toxin A/B Negative             See below for Radiology    Scheduled Med:   amLODIPine  10 mg Oral Daily    atorvastatin  20 mg Oral QHS    colchicine  1.2 mg Oral Daily    LIDOcaine (PF) 20 mg/mL (2%)        piperacillin-tazobactam (ZOSYN) IVPB  4.5 g Intravenous Q8H        Continuous Infusions:   lactated ringers 100 mL/hr at 09/12/22 1559        PRN Meds:  acetaminophen, albuterol-ipratropium, glucagon (human recombinant), glucose, glucose, HYDROcodone-acetaminophen, morphine, ondansetron       Assessment/Plan:  Pancreatic Mass with Pancreatic Duct Dilation  Acute Epigastric Abdominal Pain  Colitis  Gout exacerbation  Possible LLL  Pneumonia  Leukocytosis  Elevated Inflammatory Markers  Essential HTN  Hx of HLD, anxiety, depression     Plan:  White cell count is trending down   Colonoscopy today   Will wait for further GI recommendations   On Zosyn  C diff negative stool for WBC positive will await stool PCR panel and stool cultures  Patient was given a dose of prednisone and colchicine and swelling and redness is better  Continue with LR at 100 cc an hour blood cultures negative until now   GI following possible plan for EUS outpatient will await further GI recommendations        VTE prophylaxis: scd    Patient condition:  Stable/Fair/Guarded/ Serious/ Critical    Anticipated discharge and Disposition:         All diagnosis and differential diagnosis have been reviewed; assessment and plan has been documented; I have personally reviewed the labs and test results that are presently available; I have reviewed the patients medication list; I have reviewed the consulting providers response and recommendations. I have reviewed or attempted to review medical records based upon their availability    All of the patient's questions have been  addressed and answered. Patient's is agreeable to the above stated plan. I will continue to monitor closely and make adjustments to medical management as needed.  _____________________________________________________________________    Nutrition Status:    Radiology:  X-Ray Chest PA And Lateral  Narrative: EXAMINATION:  XR CHEST PA AND LATERAL    CLINICAL HISTORY:  ? pneumonia;    COMPARISON:  None    FINDINGS:  PA and lateral views of the chest show linear atelectasis in the left lung base without focal consolidation, pneumothorax or pleural effusion.  Cardiac silhouette and pulmonary vasculature are normal.  No acute osseous findings.  Impression: No acute findings in the chest    Electronically signed by: Dawit Durant MD  Date:    09/10/2022  Time:    10:13      Shelli Prater MD   09/13/2022

## 2022-09-14 LAB
BACTERIA BLD CULT: NORMAL
BACTERIA BLD CULT: NORMAL

## 2022-09-15 LAB
DHEA SERPL-MCNC: NORMAL
ESTROGEN SERPL-MCNC: NORMAL PG/ML
INSULIN SERPL-ACNC: NORMAL U[IU]/ML
LAB AP CLINICAL INFORMATION: NORMAL
LAB AP GROSS DESCRIPTION: NORMAL
LAB AP REPORT FOOTNOTES: NORMAL
T3RU NFR SERPL: NORMAL %

## 2022-09-16 NOTE — ANESTHESIA POSTPROCEDURE EVALUATION
Anesthesia Post Evaluation    Patient: Yosvany Driver    Procedure(s) Performed: Procedure(s) (LRB):  COLONOSCOPY, WITH 1 OR MORE BIOPSIES (N/A)    Final Anesthesia Type: general      Patient location during evaluation: PACU  Patient participation: Yes- Able to Participate  Level of consciousness: awake and alert  Post-procedure vital signs: reviewed and stable  Pain management: adequate  Airway patency: patent      Anesthetic complications: no      Cardiovascular status: blood pressure returned to baseline  Respiratory status: unassisted  Hydration status: euvolemic  Follow-up not needed.          Vitals Value Taken Time   /84 09/13/22 0910   Temp ** 09/16/22 0733   Pulse 65 09/13/22 0910   Resp 19 09/13/22 0910   SpO2 97 % 09/13/22 0910         Event Time   Out of Recovery 09/13/2022 08:47:55         Pain/Thelma Score: No data recorded

## 2022-09-23 NOTE — DISCHARGE SUMMARY
[facility] Hospital Medicine Discharge Summary    Admit Date: 9/9/2022  Discharge Date and Time:  September 13, 2022   Admitting Physician:  Team  Discharging Physician: Shelli Prater MD.  Primary Care Physician: Provider [facility]  Consults: Gastroenterology    Discharge Diagnoses:  Please see September 13, 2022 is note for hospital stay physical exam discharge diagnosis    Hospital Course:   Please see September 13, 2022 is note for hospital stay physical exam discharge diagnosis    GI was okay discharging the patient on p.o. Cipro and Flagyl  Pt was seen and examined on the day of discharge  Vitals:  VITAL SIGNS: 24 HRS MIN & MAX LAST   No data recorded 98 °F (36.7 °C)   No data recorded 127/68   No data recorded  65   No data recorded 16   No data recorded (!) 94 %         Physical Exam:  Please see September 13, 2022 is note for hospital stay physical exam discharge diagnosis      Procedures Performed: No admission procedures for hospital encounter.     Significant Diagnostic Studies: See Full reports for all details    No results for input(s): WBC, RBC, HGB, HCT, MCV, MCH, MCHC, RDW, PLT, MPV, GRAN, LYMPH, MONO, BASO, NRBC in the last 168 hours.    No results for input(s): NA, K, CL, CO2, ANIONGAP, BUN, CREATININE, GLU, CALCIUM, PH, MG, ALBUMIN, PROT, ALKPHOS, ALT, AST, BILITOT in the last 168 hours.     Microbiology Results (last 7 days)       Procedure Component Value Units Date/Time    Stool Culture [[id number]]  (Normal) Collected: 09/09/22 0916    Order Status: Completed Specimen: Stool Updated: 09/11/22 1141     Stool Culture Negative for Salmonella, Shigella, Campylobacter, Vibrio, Aeromonas, Pleisiomonas,Yersinia, or Shiga Toxin 1 and 2.    Clostridium Diff Toxin, A & B, EIA [[id number]]  (Normal) Collected: 09/09/22 1234    Order Status: Completed Specimen: Stool Updated: 09/09/22 1351     Clostridium Difficile GDH Antigen Negative     Clostridium Difficile Toxin  A/B Negative             X-Ray Chest PA And Lateral  Narrative: EXAMINATION:  XR CHEST PA AND LATERAL    CLINICAL HISTORY:  ? pneumonia;    COMPARISON:  None    FINDINGS:  PA and lateral views of the chest show linear atelectasis in the left lung base without focal consolidation, pneumothorax or pleural effusion.  Cardiac silhouette and pulmonary vasculature are normal.  No acute osseous findings.  Impression: No acute findings in the chest    Electronically signed by: Dawit Durant MD  Date:    09/10/2022  Time:    10:13         Medication List        CONTINUE taking these medications      amLODIPine 10 MG tablet  Commonly known as: NORVASC     buPROPion 150 MG TB24 tablet  Commonly known as: WELLBUTRIN XL     colchicine 0.6 mg tablet  Commonly known as: COLCRYS     hydroCHLOROthiazide 12.5 MG Tab  Commonly known as: HYDRODIURIL     losartan 100 MG tablet  Commonly known as: COZAAR     meloxicam 7.5 MG tablet  Commonly known as: MOBIC     PANCRELIPASE EC ORAL     simvastatin 20 MG tablet  Commonly known as: ZOCOR            ASK your doctor about these medications      ciprofloxacin HCl 500 MG tablet  Commonly known as: CIPRO  Take 1 tablet (500 mg total) by mouth every 12 (twelve) hours. for 5 days  Ask about: Should I take this medication?     metroNIDAZOLE 500 MG tablet  Commonly known as: FLAGYL  Take 1 tablet (500 mg total) by mouth every 8 (eight) hours. for 5 days  Ask about: Should I take this medication?               Where to Get Your Medications        These medications were sent to [facility] [address]  [address], [place] [facility] [address]      Phone: [phone]   ciprofloxacin HCl 500 MG tablet  metroNIDAZOLE 500 MG tablet          Explained in detail to the patient about the discharge plan, medications, and follow-up visits. Pt understands and agrees with the treatment plan  Discharge Disposition: Home or Self Care    Discharged Condition: stable  Diet-    Medications Per DC med rec  Activities as tolerated   Follow-up Information       Provider [facility] Follow up in 1 week(s).               Howard Bar MD Follow up in 1 week(s).    Specialty: Gastroenterology  Why: [facility] office will call with appointment.  Contact information:  [facility] [address] [address]  [phone]                           For further questions contact hospitalist office    Discharge time 33 minutes    For worsening symptoms, chest pain, shortness of breath, increased abdominal pain, high grade fever, stroke or stroke like symptoms, immediately go to the nearest Emergency Room or call 911 as soon as possible.      Shelli Brown M.D, on 9/23/2022. at 4:42 PM.

## (undated) DEVICE — KIT SURGICAL COLON .25 1.1OZ

## (undated) DEVICE — COLLECTION SPECIMEN NEPTUNE

## (undated) DEVICE — BAG LABGUARD BIOHAZARD 6X9IN

## (undated) DEVICE — KIT CANIST SUCTION 1200CC

## (undated) DEVICE — ADAPTER DUAL NSL LUER M-M 7FT

## (undated) DEVICE — TRAP SUCTION POLYP

## (undated) DEVICE — SOL IRRI STRL WATER 1000ML

## (undated) DEVICE — FORCEP BX LG CAP 2.8MMX240CM

## (undated) DEVICE — TIP SUCTION YANKAUER

## (undated) DEVICE — UNDERPAD DISPOSABLE 30X30IN

## (undated) DEVICE — CONTAINER SPEC STRL PATH 4OZ